# Patient Record
Sex: MALE | Race: WHITE | NOT HISPANIC OR LATINO
[De-identification: names, ages, dates, MRNs, and addresses within clinical notes are randomized per-mention and may not be internally consistent; named-entity substitution may affect disease eponyms.]

---

## 2015-11-04 RX ORDER — FUROSEMIDE 40 MG
1 TABLET ORAL
Qty: 0 | Refills: 0 | COMMUNITY
Start: 2015-11-04

## 2017-01-04 ENCOUNTER — APPOINTMENT (OUTPATIENT)
Dept: HEART AND VASCULAR | Facility: CLINIC | Age: 72
End: 2017-01-04

## 2017-01-04 ENCOUNTER — APPOINTMENT (OUTPATIENT)
Dept: NEPHROLOGY | Facility: CLINIC | Age: 72
End: 2017-01-04

## 2017-01-04 ENCOUNTER — RX RENEWAL (OUTPATIENT)
Age: 72
End: 2017-01-04

## 2017-01-04 VITALS
DIASTOLIC BLOOD PRESSURE: 86 MMHG | SYSTOLIC BLOOD PRESSURE: 176 MMHG | HEIGHT: 69 IN | BODY MASS INDEX: 30.81 KG/M2 | HEART RATE: 62 BPM | WEIGHT: 208 LBS

## 2017-01-04 VITALS — DIASTOLIC BLOOD PRESSURE: 83 MMHG | HEART RATE: 61 BPM | SYSTOLIC BLOOD PRESSURE: 157 MMHG

## 2017-01-04 VITALS — SYSTOLIC BLOOD PRESSURE: 150 MMHG | DIASTOLIC BLOOD PRESSURE: 78 MMHG

## 2017-01-08 LAB
BASOPHILS # BLD AUTO: 0.05 K/UL
BASOPHILS NFR BLD AUTO: 0.5 %
EOSINOPHIL # BLD AUTO: 0.26 K/UL
EOSINOPHIL NFR BLD AUTO: 2.7 %
HCT VFR BLD CALC: 43.1 %
HGB BLD-MCNC: 13.6 G/DL
IMM GRANULOCYTES NFR BLD AUTO: 0.5 %
LYMPHOCYTES # BLD AUTO: 1.2 K/UL
LYMPHOCYTES NFR BLD AUTO: 12.6 %
MAN DIFF?: NORMAL
MCHC RBC-ENTMCNC: 29.7 PG
MCHC RBC-ENTMCNC: 31.6 GM/DL
MCV RBC AUTO: 94.1 FL
MONOCYTES # BLD AUTO: 0.39 K/UL
MONOCYTES NFR BLD AUTO: 4.1 %
NEUTROPHILS # BLD AUTO: 7.56 K/UL
NEUTROPHILS NFR BLD AUTO: 79.6 %
PLATELET # BLD AUTO: 209 K/UL
RBC # BLD: 4.58 M/UL
RBC # FLD: 15.7 %
TACROLIMUS SERPL-MCNC: 6.7 NG/ML
WBC # FLD AUTO: 9.51 K/UL

## 2017-01-09 ENCOUNTER — RX RENEWAL (OUTPATIENT)
Age: 72
End: 2017-01-09

## 2017-01-16 ENCOUNTER — FORM ENCOUNTER (OUTPATIENT)
Age: 72
End: 2017-01-16

## 2017-01-17 ENCOUNTER — OUTPATIENT (OUTPATIENT)
Dept: OUTPATIENT SERVICES | Facility: HOSPITAL | Age: 72
LOS: 1 days | End: 2017-01-17
Payer: MEDICARE

## 2017-01-17 PROCEDURE — 75574 CT ANGIO HRT W/3D IMAGE: CPT | Mod: 26

## 2017-01-17 PROCEDURE — 75574 CT ANGIO HRT W/3D IMAGE: CPT

## 2017-01-23 ENCOUNTER — FORM ENCOUNTER (OUTPATIENT)
Age: 72
End: 2017-01-23

## 2017-01-23 VITALS
HEIGHT: 66 IN | DIASTOLIC BLOOD PRESSURE: 73 MMHG | OXYGEN SATURATION: 95 % | RESPIRATION RATE: 18 BRPM | SYSTOLIC BLOOD PRESSURE: 135 MMHG | TEMPERATURE: 98 F | HEART RATE: 56 BPM | WEIGHT: 203.05 LBS

## 2017-01-23 RX ORDER — CHLORHEXIDINE GLUCONATE 213 G/1000ML
1 SOLUTION TOPICAL ONCE
Qty: 0 | Refills: 0 | Status: DISCONTINUED | OUTPATIENT
Start: 2017-01-24 | End: 2017-01-24

## 2017-01-23 NOTE — H&P ADULT. - PROBLEM SELECTOR PLAN 2
baseline Cr ~1.4, BUN/Cr currently 31/1.66, will hydrate with NS@75cc/hr with frequent lung checks. Renal Dr. Jason consulted, agrees with hydration and recommends Mucomyst 1200mg PO x 1 dose

## 2017-01-23 NOTE — H&P ADULT. - ASSESSMENT
71 yr old M current ETOH abuser/former smoker, PMHx of HTN, hyperlipidemia, DM, renal transplant 2011, Afib, moderate AR, chronic diastolic CHF, with CCS Angina Class IV equivalent symptoms and abnormal CTA coronaries who presents to [facility] for recommended right and left heart catheterization with possible intervention.    Risks & benefits of procedure and alternative therapy have been explained to the patient including but not limited to: allergic reaction, bleeding w/possible need for blood transfusion, infection, renal and vascular compromise, limb damage, arrhythmia, stroke, vessel dissection/perforation, Myocardial infarction, emergent CABG. Informed consent obtained and in chart. 71 yr old M current ETOH abuser/former smoker, PMHx of HTN, hyperlipidemia, DM, renal transplant 2011, Afib, moderate AS, chronic diastolic CHF, with CCS Angina Class IV equivalent symptoms and abnormal CTA coronaries who presents to [facility] for recommended right and left heart catheterization with possible intervention.    Risks & benefits of procedure and alternative therapy have been explained to the patient including but not limited to: allergic reaction, bleeding w/possible need for blood transfusion, infection, renal and vascular compromise, limb damage, arrhythmia, stroke, vessel dissection/perforation, Myocardial infarction, emergent CABG. Informed consent obtained and in chart.

## 2017-01-23 NOTE — H&P ADULT. - PROBLEM SELECTOR PLAN 1
NPO, precath/consented, will load with ASA 325mg PO x 1 dose and Plavix 600mg PO x 1 dose. Will obtain Echo prior to cath as per Dr. Becker

## 2017-01-23 NOTE — H&P ADULT. - HISTORY OF PRESENT ILLNESS
**********Pt to bring in medications, please confirm    69yo man recent former smoker (quit 3 mos ago, previously 2 ppd x 50 years) PMH BPH, renal transplant 2011 @ [facility] (followed by Dr. Klein, no complications per pt), aortic stenosis, afib on pradaxa (last dose 1/22/2016 per pt), CHF w/ pEF 60%, HTN, HLD, IDDM2 who presented to Dr. Becker office with complaints of new onset dyspnea on exertion of <3 blocks, relieved with rest. Denies CP, palpitations, recent GIB, N/V, orthopnea, PND. Pt was subsequently sent for CTA 11/28/16 which revealed moderate aortic stenosis, aortic valve area 1.2 cm2, LV function is  mildly decreased, EF 50%, RV function is  mildly decreased, EF 43 %. **********Pt to bring in medications, please confirm    71yo man, reluctant historian, current ETOH use (3-4 shots of hard liquor/day), recent former smoker (quit 3 mos ago, previously 2 ppd x 50 years) PMH BPH, PVD, renal transplant 2011 @ [facility] (followed by Dr. Klein, no complications per pt), aortic stenosis, afib on pradaxa (last dose 1/22/2016 per pt), CHF w/ pEF 60%, HTN, HLD, IDDM2 who presented to Dr. Becker office with complaints of new onset dyspnea on exertion of <3 blocks, relieved with rest. Denies CP, palpitations, recent GIB, N/V, orthopnea, PND. Pt was subsequently sent for MRI 11/28/16 which revealed moderate aortic stenosis, aortic valve area 1.2 cm2, LV function is  mildly decreased, EF 50%, RV function is  mildly decreased, EF 43 %. CTA on 1/17/17 revealed calcium score is severely elevated at 3287, minimal stenosis in left main, severe stenosis in the proximal LCx, nonobstructive plaque in mid LCx and OM1 and minimal stenosis in OM 2, nonobstructive plaque in LAD, minimal stenosis in D1 and D2, mild stenosis in prox and mid RCA, nonobstructive plaque in distal RCA,  RPDA and RPL.     In light of pt's known risk factors as above, CCS class 3 anginal equivalent symptoms and recent abnormal CTA, pt now presents to [facility] for R and L heart catheterization with possible intervention to assess underlying CAD. **********Pt to bring in medications, please confirm    71 y/o man, reluctant historian, current ETOH use (3-4 shots of hard liquor/day), recent former smoker (quit 3 mos ago, previously 2 ppd x 50 years) PMH RBBB, BPH, PVD, renal transplant 2011 @ [facility] (followed by Dr. Klein, no complications per pt), aortic stenosis, afib on pradaxa (last dose 1/22/2016 per pt), CHF w/ pEF 60%, HTN, HLD, IDDM2 who presented to Dr. Becker office 1/04/17 with complaints of new onset non-exertional L sided chest "sensation" and dyspnea on exertion of < 3 blocks, relieved with rest. Denies palpitations, recent GIB, N/V, orthopnea, PND. Per MD note, EKG done that day revealed atrial fibrillation with RBBB. Pt was subsequently sent for CTA on 1/17/17 revealed calcium score is severely elevated at 3287, minimal stenosis in left main, severe stenosis in the proximal LCx, nonobstructive plaque in mid LCx and OM1 and minimal stenosis in OM 2, nonobstructive plaque in LAD, minimal stenosis in D1 and D2, mild stenosis in prox and mid RCA, nonobstructive plaque in distal RCA, RPDA and RPL. Previous MRI 11/28/16 which revealed moderate aortic stenosis, aortic valve area 1.2 cm2, LV function is  mildly decreased, EF 50%, RV function is  mildly decreased, EF 43 %.      In light of pt's known risk factors as above, CCS class 3 anginal equivalent symptoms and recent abnormal CTA, pt now presents to [facility] for R and L heart catheterization with possible intervention to assess underlying CAD. 71 yr old M reluctant historian, current ETOH use (3-4 shots of hard liquor/day), recent former smoker (quit 3 mos ago, previously 2 ppd x 50 years) PMH RBBB, BPH, PVD, renal transplant 2011 @ [facility] (followed by Dr. Klein, no complications per pt), aortic stenosis, afib on pradaxa (last dose 1/22/2016 per pt), CHF w/ pEF 60%, HTN, HLD, IDDM2 who presented to Dr. Becker office 1/04/17 with complaints of new onset non-exertional L sided chest "sensation" and dyspnea on exertion of < 3 blocks, relieved with rest. Denies palpitations, recent GIB, N/V, orthopnea, PND. Per MD note, EKG done that day revealed atrial fibrillation with RBBB. Pt was subsequently sent for CTA on 1/17/17 which revealed calcium score is severely elevated at 3287, minimal stenosis in left main, severe stenosis in the proximal LCx, nonobstructive plaque in mid LCx and OM1 and minimal stenosis in OM 2, nonobstructive plaque in LAD, minimal stenosis in D1 and D2, mild stenosis in prox and mid RCA, nonobstructive plaque in distal RCA, RPDA and RPL. Previous Cardiac MRI 11/28/16 revealed moderate aortic stenosis, aortic valve area 1.2 cm2, LV function is  mildly decreased, EF 50%, RV function is  mildly decreased, EF 43 %.      In light of pt's known risk factors as above, CCS class IV anginal equivalent symptoms and recent abnormal CTA, pt now presents to [facility] for R and L heart catheterization with possible intervention to assess underlying CAD. 71 yr old M reluctant historian, current ETOH use (3-4 shots of hard liquor/day), recent former smoker (quit 3 mos ago, previously 2 ppd x 50 years) [facility] HTN, hyperlipidemia, DM, BPH, PVD, CKD s/p renal transplant 2011 @ [facility] (followed by Dr. Jason, no complications per pt), aortic stenosis, afib on pradaxa (last dose 1/21/17 PM), CHF w/ pEF 60%,  who presented to Dr. Becker office 1/04/17 with complaints of new onset non-exertional L sided chest "sensation" and dyspnea on exertion of < 3 blocks, relieved with rest. Denies palpitations, recent GIB, N/V, orthopnea, PND. Per MD note, EKG done that day revealed atrial fibrillation with RBBB. Pt was subsequently sent for CTA on 1/17/17 which revealed calcium score is severely elevated at 3287, minimal stenosis in left main, severe stenosis in the proximal LCx, nonobstructive plaque in mid LCx and OM1 and minimal stenosis in OM 2, nonobstructive plaque in LAD, minimal stenosis in D1 and D2, mild stenosis in prox and mid RCA, nonobstructive plaque in distal RCA, RPDA and RPL. Previous Cardiac MRI 11/28/16 revealed moderate aortic stenosis, aortic valve area 1.2 cm2, LV function is  mildly decreased, EF 50%, RV function is  mildly decreased, EF 43 %.      In light of pt's known risk factors as above, CCS class IV anginal equivalent symptoms and recent abnormal CTA, pt now presents to [facility] for R and L heart catheterization with possible intervention to assess underlying CAD.

## 2017-01-24 ENCOUNTER — INPATIENT (INPATIENT)
Facility: HOSPITAL | Age: 72
LOS: 0 days | Discharge: HOME | DRG: 247 | End: 2017-01-25
Attending: INTERNAL MEDICINE | Admitting: INTERNAL MEDICINE
Payer: COMMERCIAL

## 2017-01-24 DIAGNOSIS — N18.9 CHRONIC KIDNEY DISEASE, UNSPECIFIED: ICD-10-CM

## 2017-01-24 DIAGNOSIS — I20.9 ANGINA PECTORIS, UNSPECIFIED: ICD-10-CM

## 2017-01-24 LAB
ALBUMIN SERPL ELPH-MCNC: 4.3 G/DL — SIGNIFICANT CHANGE UP (ref 3.4–5)
ALP SERPL-CCNC: 51 U/L — SIGNIFICANT CHANGE UP (ref 40–120)
ALT FLD-CCNC: 20 U/L — SIGNIFICANT CHANGE UP (ref 12–42)
ANION GAP SERPL CALC-SCNC: 8 MMOL/L — LOW (ref 9–16)
APTT BLD: 43 SEC — HIGH (ref 27.5–37.4)
AST SERPL-CCNC: 13 U/L — LOW (ref 15–37)
BASOPHILS NFR BLD AUTO: 0.7 % — SIGNIFICANT CHANGE UP (ref 0–2)
BILIRUB SERPL-MCNC: 0.9 MG/DL — SIGNIFICANT CHANGE UP (ref 0.2–1.2)
BUN SERPL-MCNC: 31 MG/DL — HIGH (ref 7–23)
CALCIUM SERPL-MCNC: 8.9 MG/DL — SIGNIFICANT CHANGE UP (ref 8.5–10.5)
CHLORIDE SERPL-SCNC: 101 MMOL/L — SIGNIFICANT CHANGE UP (ref 96–108)
CHOLEST SERPL-MCNC: 138 MG/DL — SIGNIFICANT CHANGE UP
CK MB CFR SERPL CALC: 1.3 NG/ML — SIGNIFICANT CHANGE UP (ref 0.5–3.6)
CO2 SERPL-SCNC: 25 MMOL/L — SIGNIFICANT CHANGE UP (ref 22–31)
CREAT SERPL-MCNC: 1.66 MG/DL — HIGH (ref 0.5–1.3)
CRP SERPL-MCNC: <0.29 MG/DL — SIGNIFICANT CHANGE UP
EOSINOPHIL NFR BLD AUTO: 4 % — SIGNIFICANT CHANGE UP (ref 0–6)
GLUCOSE SERPL-MCNC: 137 MG/DL — HIGH (ref 70–99)
HBA1C BLD-MCNC: 7.2 % — HIGH (ref 4.8–5.6)
HCT VFR BLD CALC: 42.1 % — SIGNIFICANT CHANGE UP (ref 39–50)
HDLC SERPL-MCNC: 44 MG/DL — SIGNIFICANT CHANGE UP
HGB BLD-MCNC: 13.9 G/DL — SIGNIFICANT CHANGE UP (ref 13–17)
INR BLD: 1.15 — SIGNIFICANT CHANGE UP (ref 0.88–1.16)
LIPID PNL WITH DIRECT LDL SERPL: 66 MG/DL — SIGNIFICANT CHANGE UP
LYMPHOCYTES # BLD AUTO: 20.4 % — SIGNIFICANT CHANGE UP (ref 13–44)
MCHC RBC-ENTMCNC: 30 PG — SIGNIFICANT CHANGE UP (ref 27–34)
MCHC RBC-ENTMCNC: 33 G/DL — SIGNIFICANT CHANGE UP (ref 32–36)
MCV RBC AUTO: 90.9 FL — SIGNIFICANT CHANGE UP (ref 80–100)
MONOCYTES NFR BLD AUTO: 9.3 % — SIGNIFICANT CHANGE UP (ref 2–14)
NEUTROPHILS NFR BLD AUTO: 65.6 % — SIGNIFICANT CHANGE UP (ref 43–77)
PLATELET # BLD AUTO: 177 K/UL — SIGNIFICANT CHANGE UP (ref 150–400)
POTASSIUM SERPL-MCNC: 4.9 MMOL/L — SIGNIFICANT CHANGE UP (ref 3.5–5.3)
POTASSIUM SERPL-SCNC: 4.9 MMOL/L — SIGNIFICANT CHANGE UP (ref 3.5–5.3)
PROT SERPL-MCNC: 8.1 G/DL — SIGNIFICANT CHANGE UP (ref 6.4–8.2)
PROTHROM AB SERPL-ACNC: 12.8 SEC — SIGNIFICANT CHANGE UP (ref 10–13.1)
RBC # BLD: 4.63 M/UL — SIGNIFICANT CHANGE UP (ref 4.2–5.8)
RBC # FLD: 15.2 % — SIGNIFICANT CHANGE UP (ref 10.3–16.9)
SODIUM SERPL-SCNC: 134 MMOL/L — LOW (ref 135–145)
TOTAL CHOLESTEROL/HDL RATIO MEASUREMENT: 3.1 RATIO — SIGNIFICANT CHANGE UP
TRIGL SERPL-MCNC: 139 MG/DL — SIGNIFICANT CHANGE UP
WBC # BLD: 6.7 K/UL — SIGNIFICANT CHANGE UP (ref 3.8–10.5)
WBC # FLD AUTO: 6.7 K/UL — SIGNIFICANT CHANGE UP (ref 3.8–10.5)

## 2017-01-24 PROCEDURE — 92928 PRQ TCAT PLMT NTRAC ST 1 LES: CPT | Mod: LC

## 2017-01-24 PROCEDURE — 99223 1ST HOSP IP/OBS HIGH 75: CPT

## 2017-01-24 PROCEDURE — 93571 IV DOP VEL&/PRESS C FLO 1ST: CPT | Mod: 26,LD

## 2017-01-24 PROCEDURE — 93010 ELECTROCARDIOGRAM REPORT: CPT

## 2017-01-24 PROCEDURE — 93572 IV DOP VEL&/PRESS C FLO EA: CPT | Mod: 26,LC

## 2017-01-24 PROCEDURE — 93456 R HRT CORONARY ARTERY ANGIO: CPT | Mod: 26,XU

## 2017-01-24 RX ORDER — POTASSIUM CHLORIDE 20 MEQ
20 PACKET (EA) ORAL ONCE
Qty: 0 | Refills: 0 | Status: COMPLETED | OUTPATIENT
Start: 2017-01-24 | End: 2017-01-24

## 2017-01-24 RX ORDER — SODIUM CHLORIDE 9 MG/ML
500 INJECTION INTRAMUSCULAR; INTRAVENOUS; SUBCUTANEOUS
Qty: 0 | Refills: 0 | Status: DISCONTINUED | OUTPATIENT
Start: 2017-01-24 | End: 2017-01-24

## 2017-01-24 RX ORDER — LINAGLIPTIN 5 MG/1
1 TABLET, FILM COATED ORAL
Qty: 0 | Refills: 0 | COMMUNITY

## 2017-01-24 RX ORDER — ACETYLCYSTEINE 200 MG/ML
1200 VIAL (ML) MISCELLANEOUS ONCE
Qty: 0 | Refills: 0 | Status: COMPLETED | OUTPATIENT
Start: 2017-01-24 | End: 2017-01-24

## 2017-01-24 RX ORDER — FAMOTIDINE 10 MG/ML
20 INJECTION INTRAVENOUS ONCE
Qty: 0 | Refills: 0 | Status: COMPLETED | OUTPATIENT
Start: 2017-01-24 | End: 2017-01-24

## 2017-01-24 RX ORDER — CLOPIDOGREL BISULFATE 75 MG/1
75 TABLET, FILM COATED ORAL DAILY
Qty: 0 | Refills: 0 | Status: DISCONTINUED | OUTPATIENT
Start: 2017-01-24 | End: 2017-01-25

## 2017-01-24 RX ORDER — TACROLIMUS 5 MG/1
1 CAPSULE ORAL DAILY
Qty: 0 | Refills: 0 | Status: DISCONTINUED | OUTPATIENT
Start: 2017-01-24 | End: 2017-01-25

## 2017-01-24 RX ORDER — FAMOTIDINE 10 MG/ML
1 INJECTION INTRAVENOUS
Qty: 0 | Refills: 0 | COMMUNITY

## 2017-01-24 RX ORDER — MYCOPHENOLIC ACID 180 MG/1
720 TABLET, DELAYED RELEASE ORAL
Qty: 0 | Refills: 0 | Status: DISCONTINUED | OUTPATIENT
Start: 2017-01-24 | End: 2017-01-25

## 2017-01-24 RX ORDER — RANITIDINE HYDROCHLORIDE 150 MG/1
1 TABLET, FILM COATED ORAL
Qty: 0 | Refills: 0 | COMMUNITY

## 2017-01-24 RX ORDER — CHOLECALCIFEROL (VITAMIN D3) 125 MCG
1 CAPSULE ORAL
Qty: 0 | Refills: 0 | COMMUNITY

## 2017-01-24 RX ORDER — ASPIRIN/CALCIUM CARB/MAGNESIUM 324 MG
81 TABLET ORAL DAILY
Qty: 0 | Refills: 0 | Status: DISCONTINUED | OUTPATIENT
Start: 2017-01-24 | End: 2017-01-25

## 2017-01-24 RX ORDER — TACROLIMUS 5 MG/1
1.5 CAPSULE ORAL
Qty: 0 | Refills: 0 | COMMUNITY

## 2017-01-24 RX ORDER — SODIUM CHLORIDE 9 MG/ML
500 INJECTION INTRAMUSCULAR; INTRAVENOUS; SUBCUTANEOUS
Qty: 0 | Refills: 0 | Status: DISCONTINUED | OUTPATIENT
Start: 2017-01-24 | End: 2017-01-25

## 2017-01-24 RX ORDER — METOPROLOL TARTRATE 50 MG
100 TABLET ORAL ONCE
Qty: 0 | Refills: 0 | Status: COMPLETED | OUTPATIENT
Start: 2017-01-24 | End: 2017-01-24

## 2017-01-24 RX ORDER — REPAGLINIDE 1 MG/1
1 TABLET ORAL
Qty: 0 | Refills: 0 | COMMUNITY

## 2017-01-24 RX ORDER — VALSARTAN 80 MG/1
320 TABLET ORAL DAILY
Qty: 0 | Refills: 0 | Status: DISCONTINUED | OUTPATIENT
Start: 2017-01-24 | End: 2017-01-25

## 2017-01-24 RX ORDER — POTASSIUM CHLORIDE 20 MEQ
1 PACKET (EA) ORAL
Qty: 0 | Refills: 0 | COMMUNITY

## 2017-01-24 RX ORDER — THIAMINE MONONITRATE (VIT B1) 100 MG
100 TABLET ORAL ONCE
Qty: 0 | Refills: 0 | Status: COMPLETED | OUTPATIENT
Start: 2017-01-24 | End: 2017-01-24

## 2017-01-24 RX ORDER — ATORVASTATIN CALCIUM 80 MG/1
10 TABLET, FILM COATED ORAL ONCE
Qty: 0 | Refills: 0 | Status: COMPLETED | OUTPATIENT
Start: 2017-01-24 | End: 2017-01-24

## 2017-01-24 RX ORDER — DABIGATRAN ETEXILATE MESYLATE 150 MG/1
150 CAPSULE ORAL
Qty: 0 | Refills: 0 | Status: DISCONTINUED | OUTPATIENT
Start: 2017-01-24 | End: 2017-01-25

## 2017-01-24 RX ORDER — CLOPIDOGREL BISULFATE 75 MG/1
600 TABLET, FILM COATED ORAL ONCE
Qty: 0 | Refills: 0 | Status: COMPLETED | OUTPATIENT
Start: 2017-01-24 | End: 2017-01-24

## 2017-01-24 RX ORDER — CLOPIDOGREL BISULFATE 75 MG/1
75 TABLET, FILM COATED ORAL ONCE
Qty: 0 | Refills: 0 | Status: DISCONTINUED | OUTPATIENT
Start: 2017-01-24 | End: 2017-01-24

## 2017-01-24 RX ORDER — MAGNESIUM OXIDE 400 MG ORAL TABLET 241.3 MG
1 TABLET ORAL
Qty: 0 | Refills: 0 | COMMUNITY

## 2017-01-24 RX ORDER — MAGNESIUM OXIDE 400 MG ORAL TABLET 241.3 MG
400 TABLET ORAL ONCE
Qty: 0 | Refills: 0 | Status: DISCONTINUED | OUTPATIENT
Start: 2017-01-24 | End: 2017-01-25

## 2017-01-24 RX ORDER — ASPIRIN/CALCIUM CARB/MAGNESIUM 324 MG
325 TABLET ORAL ONCE
Qty: 0 | Refills: 0 | Status: COMPLETED | OUTPATIENT
Start: 2017-01-24 | End: 2017-01-24

## 2017-01-24 RX ADMIN — CLOPIDOGREL BISULFATE 600 MILLIGRAM(S): 75 TABLET, FILM COATED ORAL at 08:08

## 2017-01-24 RX ADMIN — Medication 325 MILLIGRAM(S): at 08:08

## 2017-01-24 RX ADMIN — SODIUM CHLORIDE 75 MILLILITER(S): 9 INJECTION INTRAMUSCULAR; INTRAVENOUS; SUBCUTANEOUS at 08:09

## 2017-01-24 RX ADMIN — SODIUM CHLORIDE 75 MILLILITER(S): 9 INJECTION INTRAMUSCULAR; INTRAVENOUS; SUBCUTANEOUS at 18:59

## 2017-01-24 RX ADMIN — Medication 1200 MILLIGRAM(S): at 11:24

## 2017-01-24 NOTE — CONSULT NOTE ADULT - SUBJECTIVE AND OBJECTIVE BOX
HPI:    Per PA: 71 yr old M reluctant historian, current ETOH use (3-4 shots of hard liquor/day), recent former smoker (quit 3 mos ago, previously 2 ppd x 50 years) [facility] HTN, hyperlipidemia, DM, BPH, PVD, CKD s/p renal transplant 2011 @ [facility] (followed by Dr. Jason, no complications per pt), aortic stenosis, afib on pradaxa (last dose 1/21/17 PM), CHF w/ pEF 60%,  who presented to Dr. Becker office 1/04/17 with complaints of new onset non-exertional L sided chest "sensation" and dyspnea on exertion of < 3 blocks, relieved with rest. Denies palpitations, recent GIB, N/V, orthopnea, PND. Per MD note, EKG done that day revealed atrial fibrillation with RBBB. Pt was subsequently sent for CTA on 1/17/17 which revealed calcium score is severely elevated at 3287, minimal stenosis in left main, severe stenosis in the proximal LCx, nonobstructive plaque in mid LCx and OM1 and minimal stenosis in OM 2, nonobstructive plaque in LAD, minimal stenosis in D1 and D2, mild stenosis in prox and mid RCA, nonobstructive plaque in distal RCA, RPDA and RPL. Previous Cardiac MRI 11/28/16 revealed moderate aortic stenosis, aortic valve area 1.2 cm2, LV function is  mildly decreased, EF 50%, RV function is  mildly decreased, EF 43 %. In light of pt's known risk factors as above, CCS class IV anginal equivalent symptoms and recent abnormal CTA, pt now presents to [facility] for R and L heart catheterization with possible intervention to assess underlying CAD. (23 Jan 2017 12:46) Baseline CKD 1.4 - 1.6. Seen prior to cardiac cath, receiving IVF. No complaints.       PAST MEDICAL & SURGICAL HISTORY:  AV fistula: Left forearm  Essential hypertension: HTN (hypertension)  Hyperlipidemia: Hyperlipidemia  Complication of transplanted kidney: s/p right sided renal transplant  Atrial fibrillation: Atrial fibrillation  Type 2 diabetes mellitus: Diabetes  History of kidney transplant: right      MEDICATIONS:  sodium chloride 0.9%. 500milliLiter(s) IV Continuous <Continuous>  aspirin enteric coated 81milliGRAM(s) Oral daily  valsartan 320milliGRAM(s) Oral daily  diltiazem   CD 300milliGRAM(s) Oral Once  dabigatran 150milliGRAM(s) Oral two times a day  atorvastatin 10milliGRAM(s) Oral Once  metoprolol succinate ER 100milliGRAM(s) Oral Once  famotidine    Tablet 20milliGRAM(s) Oral Once  tacrolimus 1milliGRAM(s) Oral daily  mycophenolic Acid 720milliGRAM(s) Oral two times a day  thiamine 100milliGRAM(s) Oral Once  potassium chloride    Tablet ER 20milliEquivalent(s) Oral Once  magnesium oxide 400milliGRAM(s) Oral Once  clopidogrel Tablet 75milliGRAM(s) Oral daily      No pertinent family history in first degree relatives  Family history unknown      SOCIAL HISTORY:    REVIEW OF SYSTEMS:  Constitutional: No fevers.   Card: No chest pain.   Resp: No SOB.  GI: No nausea or vomiting. No abdominal pain.  : No dysuria. Neuro: No focal weakness or sensory loss.  Eyes: No visual symptoms. MS: No joint swelling.  Skin: No rashes. Psych: No psychosis.    PHYSICAL EXAM:    Constitutional: T(C): --  HR: --  BP: --  RR: --  SpO2: --  Wt(kg): --  Appearance: Alert, pleasant, INAD.  Eyes: Conjunctivae pink, moist. Pupils equal and reactive.  ENT: External ears/nose normal appearing. Lips normal, dentition good.  Lymphatic: No cervical lymphadenopathy. No supraclavicular lymphadenopathy.  Cardiac: No rubs. NO MURMURS. Extremities: TRACE BILATERAL ANKLE EDEMA.  Respiratory: Effort no accessory muscle use. Lungs: CLEAR TO AUSCULTATION.  Abdomen: Soft. No masses. Nontender. Liver: Not palpable. Spleen: Not palpable.  Musculoskeltal digits: No clubbing or cyanosis. Tone normal. Moves all four extremities.  Skin inspection: No rashes. Palpation: No abnormalities.  Neuro: Cranial nerves: no facial assymetry or deficits noted. Sensation: LE intact to light touch.  Psych: Oriented to person, place, situation. Mood/affect: Not depressed.     DATA:  134<L>  |  101    |  31<H>  ----------------------------<  137<H>  Ca:8.9   (24 Jan 2017 07:15)  4.9     |  25     |  1.66<H>      eGFR if Non : 41 <L>  eGFR if : 48 <L>    TPro  8.1 g/dL  /  Alb  4.3 g/dL  /  TBili  0.9 mg/dL  /  DBili  x      /  AST  13 U/L<L>  /  ALT  20 U/L  /  AlkPhos  51 U/L  24 Jan 2017 07:15                        13.9   6.7   )-----------( 177      ( 24 Jan 2017 07:15 )             42.1

## 2017-01-24 NOTE — CONSULT NOTE ADULT - ASSESSMENT
* CKD s/p renal transplant stable. * HTN controlled. * At risk for contrast nephropathy.    Suggest:    1.	Follow SCr postop.  2.	Continue valsartan, diltiazem, metoprolol.  3.	Continue IVF postop per protocol.  4.	Continue tacrolimus, mycophenolate.    Please call with any questions.    Reza Jason MD, FACP, FASN | kidney.[facility]  Nephrology, Hypertension, and Internal Medicine  Mobile: [phone] (Daytime Hours Only)  Office/Answering Service: [phone]  Asst. Prof. of Medicine, [facility] - Nephrology at [address]  [address], [address], [place], [place]

## 2017-01-25 ENCOUNTER — TRANSCRIPTION ENCOUNTER (OUTPATIENT)
Age: 72
End: 2017-01-25

## 2017-01-25 VITALS — WEIGHT: 203.93 LBS

## 2017-01-25 LAB
ANION GAP SERPL CALC-SCNC: 9 MMOL/L — SIGNIFICANT CHANGE UP (ref 9–16)
BUN SERPL-MCNC: 27 MG/DL — HIGH (ref 7–23)
CALCIUM SERPL-MCNC: 8.8 MG/DL — SIGNIFICANT CHANGE UP (ref 8.5–10.5)
CHLORIDE SERPL-SCNC: 103 MMOL/L — SIGNIFICANT CHANGE UP (ref 96–108)
CO2 SERPL-SCNC: 26 MMOL/L — SIGNIFICANT CHANGE UP (ref 22–31)
CREAT SERPL-MCNC: 1.22 MG/DL — SIGNIFICANT CHANGE UP (ref 0.5–1.3)
GLUCOSE SERPL-MCNC: 156 MG/DL — HIGH (ref 70–99)
HCT VFR BLD CALC: 37.5 % — LOW (ref 39–50)
HGB BLD-MCNC: 12.4 G/DL — LOW (ref 13–17)
MAGNESIUM SERPL-MCNC: 1.8 MG/DL — SIGNIFICANT CHANGE UP (ref 1.6–2.4)
MCHC RBC-ENTMCNC: 30.1 PG — SIGNIFICANT CHANGE UP (ref 27–34)
MCHC RBC-ENTMCNC: 33.1 G/DL — SIGNIFICANT CHANGE UP (ref 32–36)
MCV RBC AUTO: 91 FL — SIGNIFICANT CHANGE UP (ref 80–100)
PLATELET # BLD AUTO: 160 K/UL — SIGNIFICANT CHANGE UP (ref 150–400)
POTASSIUM SERPL-MCNC: 4.2 MMOL/L — SIGNIFICANT CHANGE UP (ref 3.5–5.3)
POTASSIUM SERPL-SCNC: 4.2 MMOL/L — SIGNIFICANT CHANGE UP (ref 3.5–5.3)
RBC # BLD: 4.12 M/UL — LOW (ref 4.2–5.8)
RBC # FLD: 15.1 % — SIGNIFICANT CHANGE UP (ref 10.3–16.9)
SODIUM SERPL-SCNC: 138 MMOL/L — SIGNIFICANT CHANGE UP (ref 135–145)
WBC # BLD: 6.5 K/UL — SIGNIFICANT CHANGE UP (ref 3.8–10.5)
WBC # FLD AUTO: 6.5 K/UL — SIGNIFICANT CHANGE UP (ref 3.8–10.5)

## 2017-01-25 PROCEDURE — C1769: CPT

## 2017-01-25 PROCEDURE — 83735 ASSAY OF MAGNESIUM: CPT

## 2017-01-25 PROCEDURE — 85610 PROTHROMBIN TIME: CPT

## 2017-01-25 PROCEDURE — 86140 C-REACTIVE PROTEIN: CPT

## 2017-01-25 PROCEDURE — 80061 LIPID PANEL: CPT

## 2017-01-25 PROCEDURE — 83036 HEMOGLOBIN GLYCOSYLATED A1C: CPT

## 2017-01-25 PROCEDURE — 80048 BASIC METABOLIC PNL TOTAL CA: CPT

## 2017-01-25 PROCEDURE — 93005 ELECTROCARDIOGRAM TRACING: CPT

## 2017-01-25 PROCEDURE — 82553 CREATINE MB FRACTION: CPT

## 2017-01-25 PROCEDURE — 93010 ELECTROCARDIOGRAM REPORT: CPT

## 2017-01-25 PROCEDURE — C1889: CPT

## 2017-01-25 PROCEDURE — C1874: CPT

## 2017-01-25 PROCEDURE — C1887: CPT

## 2017-01-25 PROCEDURE — 93306 TTE W/DOPPLER COMPLETE: CPT

## 2017-01-25 PROCEDURE — 80053 COMPREHEN METABOLIC PANEL: CPT

## 2017-01-25 PROCEDURE — 85027 COMPLETE CBC AUTOMATED: CPT

## 2017-01-25 PROCEDURE — C1725: CPT

## 2017-01-25 PROCEDURE — 82550 ASSAY OF CK (CPK): CPT

## 2017-01-25 PROCEDURE — 85025 COMPLETE CBC W/AUTO DIFF WBC: CPT

## 2017-01-25 PROCEDURE — 85730 THROMBOPLASTIN TIME PARTIAL: CPT

## 2017-01-25 PROCEDURE — 36415 COLL VENOUS BLD VENIPUNCTURE: CPT

## 2017-01-25 PROCEDURE — C1894: CPT

## 2017-01-25 RX ORDER — CLOPIDOGREL BISULFATE 75 MG/1
1 TABLET, FILM COATED ORAL
Qty: 30 | Refills: 0 | OUTPATIENT
Start: 2017-01-25 | End: 2017-02-24

## 2017-01-25 RX ORDER — CLOPIDOGREL BISULFATE 75 MG/1
1 TABLET, FILM COATED ORAL
Qty: 30 | Refills: 2 | OUTPATIENT
Start: 2017-01-25 | End: 2017-04-24

## 2017-01-25 RX ORDER — METFORMIN HYDROCHLORIDE 850 MG/1
1 TABLET ORAL
Qty: 0 | Refills: 0 | COMMUNITY

## 2017-01-25 RX ORDER — ASPIRIN/CALCIUM CARB/MAGNESIUM 324 MG
1 TABLET ORAL
Qty: 30 | Refills: 0 | OUTPATIENT
Start: 2017-01-25 | End: 2017-02-24

## 2017-01-25 RX ORDER — DABIGATRAN ETEXILATE MESYLATE 150 MG/1
1 CAPSULE ORAL
Qty: 60 | Refills: 2 | OUTPATIENT
Start: 2017-01-25 | End: 2017-04-24

## 2017-01-25 RX ORDER — METOPROLOL TARTRATE 50 MG
1 TABLET ORAL
Qty: 30 | Refills: 2 | OUTPATIENT
Start: 2017-01-25 | End: 2017-04-24

## 2017-01-25 RX ORDER — METOPROLOL TARTRATE 50 MG
1 TABLET ORAL
Qty: 0 | Refills: 0 | COMMUNITY

## 2017-01-25 RX ORDER — MAGNESIUM OXIDE 400 MG ORAL TABLET 241.3 MG
400 TABLET ORAL ONCE
Qty: 0 | Refills: 0 | Status: COMPLETED | OUTPATIENT
Start: 2017-01-25 | End: 2017-01-25

## 2017-01-25 RX ADMIN — VALSARTAN 320 MILLIGRAM(S): 80 TABLET ORAL at 05:28

## 2017-01-25 RX ADMIN — TACROLIMUS 1 MILLIGRAM(S): 5 CAPSULE ORAL at 13:58

## 2017-01-25 RX ADMIN — Medication 81 MILLIGRAM(S): at 13:01

## 2017-01-25 RX ADMIN — DABIGATRAN ETEXILATE MESYLATE 150 MILLIGRAM(S): 150 CAPSULE ORAL at 05:26

## 2017-01-25 RX ADMIN — CLOPIDOGREL BISULFATE 75 MILLIGRAM(S): 75 TABLET, FILM COATED ORAL at 13:01

## 2017-01-25 RX ADMIN — MAGNESIUM OXIDE 400 MG ORAL TABLET 400 MILLIGRAM(S): 241.3 TABLET ORAL at 09:51

## 2017-01-25 RX ADMIN — MYCOPHENOLIC ACID 720 MILLIGRAM(S): 180 TABLET, DELAYED RELEASE ORAL at 05:27

## 2017-01-25 NOTE — DISCHARGE NOTE ADULT - PATIENT PORTAL LINK FT
“You can access the FollowHealth Patient Portal, offered by [facility], by registering with the following website: [email or web address]”

## 2017-01-25 NOTE — DISCHARGE NOTE ADULT - MEDICATION SUMMARY - MEDICATIONS TO TAKE
I will START or STAY ON the medications listed below when I get home from the hospital:    aspirin 81 mg oral tablet  -- 1 tab(s) by mouth once a day    STOP TAKING ASPIRIN AFTER ONE MONTH.  -- Indication: For Stent    valsartan 320 mg oral tablet  -- 1 tab(s) by mouth once a day  -- Indication: For Hypertension    Rapaflo 8 mg oral capsule  -- 1 cap(s) by mouth once a day  -- Indication: For BPH    diltiazem 300 mg/24 hours oral tablet, extended release  -- 1 tab(s) by mouth once a day  -- Indication: For Atrial Fibrillation    Pradaxa 150 mg oral capsule  -- 1 cap(s) by mouth 2 times a day  -- Indication: For Atrial Fibrillation    Tradjenta 5 mg oral tablet  -- 1 tab(s) by mouth once a day  -- Indication: For Diabetes    metFORMIN 1000 mg oral tablet  -- 1 tab(s) by mouth 2 times a day    RESTART ON SATURDAY JAN. 28, 2017.  -- Indication: For Diabetes    Prandin 2 mg oral tablet  -- 1 tab(s) by mouth once a day (at bedtime)  -- Indication: For Diabetes    Lipitor 10 mg oral tablet  -- 1 tab(s) by mouth once a day (at bedtime)  -- Indication: For Cholesterol    clopidogrel 75 mg oral tablet  -- 1 tab(s) by mouth once a day.  -- Indication: For Stent/CAD    Toprol-XL 50 mg oral tablet, extended release  -- 1 tab(s) by mouth once a day  -- Indication: For Atrial Fibrillation    bimatoprost topical ophthalmic  -- Apply on skin to affected area once a day (at bedtime)  -- Indication: For Skin    furosemide 40 mg oral tablet  -- 1 tab(s) by mouth every 12 hours  -- Indication: For diastolic CHF    famotidine 20 mg oral tablet  -- 1 tab(s) by mouth once a day  -- Indication: For GERD    Zantac 150 oral tablet  -- 1 tab(s) by mouth once a day  -- Indication: For GERD    raNITIdine 150 mg oral capsule  -- 1 cap(s) by mouth once a day  -- Indication: For GERD    tacrolimus 1 mg oral tablet, extended release  -- 1.5 tab(s) by mouth once a day  -- Indication: For Kidney Transplant    mycophenolic acid 360 mg oral delayed release tablet  -- 2 tab(s) by mouth every 12 hours  -- Indication: For Kidney Transplant    tacrolimus 1 mg oral capsule  -- 1 cap(s) by mouth once a day (at bedtime)  -- Indication: For Kidney Transplant    docusate sodium 100 mg oral capsule  -- 1 cap(s) by mouth 2 times a day  -- Indication: For Constipation    magnesium oxide 400 mg (241.3 mg elemental magnesium) oral tablet  -- 1 tab(s) by mouth once a day  -- Indication: For Supplement    potassium chloride 20 mEq oral tablet, extended release  -- 1 tab(s) by mouth once a day  -- Indication: For Supplement    brimonidine-timolol 0.2%-0.5% ophthalmic solution  -- 1 drop(s) to each affected eye every 12 hours  -- Indication: For Glaucoma    Vitamin D3 1000 intl units oral capsule  -- 1 cap(s) by mouth once a day  -- Indication: For Supplement    thiamine 100 mg oral tablet  -- 1 tab(s) by mouth once a day  -- Indication: For Supplement

## 2017-01-25 NOTE — PROVIDER CONTACT NOTE (OTHER) - ACTION/TREATMENT ORDERED:
upon EKG , patient noted to have a  junctional rhythm.  pacing pad placed on the patient and life pack by the bedside.

## 2017-01-25 NOTE — DISCHARGE NOTE ADULT - CARE PROVIDERS DIRECT ADDRESSES
[email or web address]

## 2017-01-25 NOTE — PROVIDER CONTACT NOTE (OTHER) - SITUATION
Patient HR drop to 33B/min  patient asymptomatic. No complaint of chest pain or discomfort.  /52. A&ox3

## 2017-01-25 NOTE — DISCHARGE NOTE ADULT - HOSPITAL COURSE
70 y/o male presented with chest pain and had abnormal CTA cors.   Pt now s/p WALLY distal LCx, WALLY prox LCx.   Pt had slow afib overnight, BB dose was decreased.  This morning pt had no complaints.  Labs acceptable, VSS.  Physical exam unremarkable, right wrist and brachial access site stable.  Pt stable for discharge today.

## 2017-01-25 NOTE — PROVIDER CONTACT NOTE (MEDICATION) - BACKGROUND
Pt admitted through cath lab and as such, has meds ordered as one time meds rather than daily meds as indicated on d/c med list. RN noted same and discussed with FLOR Lu.

## 2017-01-25 NOTE — DISCHARGE NOTE ADULT - SECONDARY DIAGNOSIS.
Atrial fibrillation, unspecified type Type 2 diabetes mellitus without complication, unspecified long term insulin use status

## 2017-01-25 NOTE — PROVIDER CONTACT NOTE (OTHER) - ASSESSMENT
Patient HR drop to 33B/min  patient asymptomatic. No complaint of chest pain or discomfort.  /52. A&ox3. patient denies chest pain or SOB.

## 2017-01-25 NOTE — DISCHARGE NOTE ADULT - CARE PROVIDER_API CALL
Colton Becker), Cardiovascular Disease; Internal Medicine  [address] [place] [address]  Phone: [phone]  Fax: [phone]

## 2017-01-25 NOTE — DISCHARGE NOTE ADULT - PLAN OF CARE
Take Aspirin, Plavix and Pradaxa (Triple Therapy) for one month, then STOP Aspirin, but continue Plavix and Pradaxa for an additional two months. Follow up with Dr. Becker in 1-2 weeks.    Monitor your right wrist for signs of infection including redness, warmth, or discharge. Also monitor for bleeding, swelling, or severe pain. Do not lift anything heavier than 5 pounds for at least 3 days. Do not soak in a tub or go swimming for at least 3 days. Avoid strenuous activity for 3-5 days, including driving. Follow up with Dr. Becker in 1-2 weeks. DO NOT TAKE METFORMIN FOR TWO DAYS. Restart Metformin on Saturday January 28, 2017. Follow up with your PMD in 1-2 weeks.

## 2017-01-25 NOTE — DISCHARGE NOTE ADULT - CARE PLAN
Principal Discharge DX:	Coronary artery disease involving native coronary artery of native heart, angina presence unspecified  Goal:	Take Aspirin, Plavix and Pradaxa (Triple Therapy) for one month, then STOP Aspirin, but continue Plavix and Pradaxa for an additional two months.  Instructions for follow-up, activity and diet:	Follow up with Dr. Becker in 1-2 weeks.    Monitor your right wrist for signs of infection including redness, warmth, or discharge. Also monitor for bleeding, swelling, or severe pain. Do not lift anything heavier than 5 pounds for at least 3 days. Do not soak in a tub or go swimming for at least 3 days. Avoid strenuous activity for 3-5 days, including driving.  Secondary Diagnosis:	Atrial fibrillation, unspecified type  Goal:	Take Aspirin, Plavix and Pradaxa (Triple Therapy) for one month, then STOP Aspirin, but continue Plavix and Pradaxa for an additional two months.  Instructions for follow-up, activity and diet:	Follow up with Dr. Becker in 1-2 weeks.  Secondary Diagnosis:	Type 2 diabetes mellitus without complication, unspecified long term insulin use status  Goal:	DO NOT TAKE METFORMIN FOR TWO DAYS. Restart Metformin on Saturday January 28, 2017.  Instructions for follow-up, activity and diet:	Follow up with your PMD in 1-2 weeks.

## 2017-01-27 DIAGNOSIS — I73.9 PERIPHERAL VASCULAR DISEASE, UNSPECIFIED: ICD-10-CM

## 2017-01-27 DIAGNOSIS — Z79.84 LONG TERM (CURRENT) USE OF ORAL HYPOGLYCEMIC DRUGS: ICD-10-CM

## 2017-01-27 DIAGNOSIS — I25.110 ATHEROSCLEROTIC HEART DISEASE OF NATIVE CORONARY ARTERY WITH UNSTABLE ANGINA PECTORIS: ICD-10-CM

## 2017-01-27 DIAGNOSIS — I25.10 ATHEROSCLEROTIC HEART DISEASE OF NATIVE CORONARY ARTERY WITHOUT ANGINA PECTORIS: ICD-10-CM

## 2017-01-27 DIAGNOSIS — N40.0 BENIGN PROSTATIC HYPERPLASIA WITHOUT LOWER URINARY TRACT SYMPTOMS: ICD-10-CM

## 2017-01-27 DIAGNOSIS — Z94.0 KIDNEY TRANSPLANT STATUS: ICD-10-CM

## 2017-01-27 DIAGNOSIS — Z87.891 PERSONAL HISTORY OF NICOTINE DEPENDENCE: ICD-10-CM

## 2017-01-27 DIAGNOSIS — N18.9 CHRONIC KIDNEY DISEASE, UNSPECIFIED: ICD-10-CM

## 2017-01-27 DIAGNOSIS — I35.0 NONRHEUMATIC AORTIC (VALVE) STENOSIS: ICD-10-CM

## 2017-01-27 DIAGNOSIS — E11.22 TYPE 2 DIABETES MELLITUS WITH DIABETIC CHRONIC KIDNEY DISEASE: ICD-10-CM

## 2017-01-27 DIAGNOSIS — Z79.01 LONG TERM (CURRENT) USE OF ANTICOAGULANTS: ICD-10-CM

## 2017-01-27 DIAGNOSIS — F10.10 ALCOHOL ABUSE, UNCOMPLICATED: ICD-10-CM

## 2017-01-27 DIAGNOSIS — I48.91 UNSPECIFIED ATRIAL FIBRILLATION: ICD-10-CM

## 2017-01-27 DIAGNOSIS — I12.9 HYPERTENSIVE CHRONIC KIDNEY DISEASE WITH STAGE 1 THROUGH STAGE 4 CHRONIC KIDNEY DISEASE, OR UNSPECIFIED CHRONIC KIDNEY DISEASE: ICD-10-CM

## 2017-01-27 DIAGNOSIS — E78.5 HYPERLIPIDEMIA, UNSPECIFIED: ICD-10-CM

## 2017-01-30 ENCOUNTER — APPOINTMENT (OUTPATIENT)
Dept: NEPHROLOGY | Facility: CLINIC | Age: 72
End: 2017-01-30

## 2017-01-30 VITALS — SYSTOLIC BLOOD PRESSURE: 104 MMHG | DIASTOLIC BLOOD PRESSURE: 52 MMHG | HEART RATE: 72 BPM

## 2017-01-30 VITALS — WEIGHT: 199 LBS | BODY MASS INDEX: 29.39 KG/M2

## 2017-01-30 VITALS — DIASTOLIC BLOOD PRESSURE: 62 MMHG | SYSTOLIC BLOOD PRESSURE: 113 MMHG

## 2017-02-04 LAB
ALBUMIN SERPL ELPH-MCNC: 4.9 G/DL
ALP BLD-CCNC: 47 U/L
ALT SERPL-CCNC: 21 U/L
ANION GAP SERPL CALC-SCNC: 19 MMOL/L
AST SERPL-CCNC: 19 U/L
BASOPHILS # BLD AUTO: 0.08 K/UL
BASOPHILS NFR BLD AUTO: 1.2 %
BILIRUB SERPL-MCNC: 0.7 MG/DL
BUN SERPL-MCNC: 30 MG/DL
CALCIUM SERPL-MCNC: 10.3 MG/DL
CHLORIDE SERPL-SCNC: 99 MMOL/L
CO2 SERPL-SCNC: 23 MMOL/L
CREAT SERPL-MCNC: 1.33 MG/DL
EOSINOPHIL # BLD AUTO: 0.17 K/UL
EOSINOPHIL NFR BLD AUTO: 2.5 %
GLUCOSE SERPL-MCNC: 142 MG/DL
HCT VFR BLD CALC: 43.1 %
HGB BLD-MCNC: 13.4 G/DL
IMM GRANULOCYTES NFR BLD AUTO: 0.6 %
LYMPHOCYTES # BLD AUTO: 1.27 K/UL
LYMPHOCYTES NFR BLD AUTO: 18.8 %
MAGNESIUM SERPL-MCNC: 2.1 MG/DL
MAN DIFF?: NORMAL
MCHC RBC-ENTMCNC: 29.8 PG
MCHC RBC-ENTMCNC: 31.1 GM/DL
MCV RBC AUTO: 95.8 FL
MONOCYTES # BLD AUTO: 0.49 K/UL
MONOCYTES NFR BLD AUTO: 7.3 %
NEUTROPHILS # BLD AUTO: 4.7 K/UL
NEUTROPHILS NFR BLD AUTO: 69.6 %
PLATELET # BLD AUTO: 210 K/UL
POTASSIUM SERPL-SCNC: 5.1 MMOL/L
PROT SERPL-MCNC: 7.7 G/DL
RBC # BLD: 4.5 M/UL
RBC # FLD: 15.5 %
SODIUM SERPL-SCNC: 141 MMOL/L
TACROLIMUS SERPL-MCNC: 4.2 NG/ML
WBC # FLD AUTO: 6.75 K/UL

## 2017-02-13 ENCOUNTER — APPOINTMENT (OUTPATIENT)
Dept: HEART AND VASCULAR | Facility: CLINIC | Age: 72
End: 2017-02-13

## 2017-02-13 VITALS
BODY MASS INDEX: 29.92 KG/M2 | HEIGHT: 69 IN | SYSTOLIC BLOOD PRESSURE: 148 MMHG | WEIGHT: 202 LBS | HEART RATE: 50 BPM | DIASTOLIC BLOOD PRESSURE: 62 MMHG

## 2017-02-13 VITALS — DIASTOLIC BLOOD PRESSURE: 68 MMHG | SYSTOLIC BLOOD PRESSURE: 134 MMHG

## 2017-02-13 DIAGNOSIS — Z87.891 PERSONAL HISTORY OF NICOTINE DEPENDENCE: ICD-10-CM

## 2017-03-19 ENCOUNTER — RX RENEWAL (OUTPATIENT)
Age: 72
End: 2017-03-19

## 2017-03-19 RX ORDER — DOCUSATE SODIUM 100 MG/1
100 CAPSULE, LIQUID FILLED ORAL
Qty: 100 | Refills: 0 | Status: ACTIVE | COMMUNITY
Start: 2017-03-19 | End: 1900-01-01

## 2017-03-30 ENCOUNTER — RX RENEWAL (OUTPATIENT)
Age: 72
End: 2017-03-30

## 2017-03-31 ENCOUNTER — RX RENEWAL (OUTPATIENT)
Age: 72
End: 2017-03-31

## 2017-04-03 ENCOUNTER — RX RENEWAL (OUTPATIENT)
Age: 72
End: 2017-04-03

## 2017-04-10 ENCOUNTER — RX RENEWAL (OUTPATIENT)
Age: 72
End: 2017-04-10

## 2017-04-14 ENCOUNTER — RX RENEWAL (OUTPATIENT)
Age: 72
End: 2017-04-14

## 2017-04-26 ENCOUNTER — APPOINTMENT (OUTPATIENT)
Dept: NEPHROLOGY | Facility: CLINIC | Age: 72
End: 2017-04-26

## 2017-04-26 VITALS — WEIGHT: 200 LBS | BODY MASS INDEX: 29.54 KG/M2

## 2017-04-26 VITALS — SYSTOLIC BLOOD PRESSURE: 143 MMHG | DIASTOLIC BLOOD PRESSURE: 71 MMHG | HEART RATE: 60 BPM

## 2017-04-26 VITALS — SYSTOLIC BLOOD PRESSURE: 144 MMHG | DIASTOLIC BLOOD PRESSURE: 83 MMHG

## 2017-04-27 ENCOUNTER — MEDICATION RENEWAL (OUTPATIENT)
Age: 72
End: 2017-04-27

## 2017-05-01 ENCOUNTER — RX RENEWAL (OUTPATIENT)
Age: 72
End: 2017-05-01

## 2017-05-02 LAB
ALBUMIN SERPL ELPH-MCNC: 4.9 G/DL
ALP BLD-CCNC: 53 U/L
ALT SERPL-CCNC: 23 U/L
ANION GAP SERPL CALC-SCNC: 20 MMOL/L
APPEARANCE: CLEAR
AST SERPL-CCNC: 20 U/L
BASOPHILS # BLD AUTO: 0.06 K/UL
BASOPHILS NFR BLD AUTO: 0.8 %
BILIRUB SERPL-MCNC: 0.4 MG/DL
BILIRUBIN URINE: NEGATIVE
BLOOD URINE: NEGATIVE
BUN SERPL-MCNC: 25 MG/DL
CALCIUM SERPL-MCNC: 10.3 MG/DL
CALCIUM SERPL-MCNC: 10.3 MG/DL
CHLORIDE SERPL-SCNC: 100 MMOL/L
CHOLEST SERPL-MCNC: 151 MG/DL
CHOLEST/HDLC SERPL: 3.7 RATIO
CO2 SERPL-SCNC: 19 MMOL/L
COLOR: YELLOW
CREAT SERPL-MCNC: 1.33 MG/DL
CREAT SPEC-SCNC: 16 MG/DL
EOSINOPHIL # BLD AUTO: 0.18 K/UL
EOSINOPHIL NFR BLD AUTO: 2.4 %
GLUCOSE QUALITATIVE U: NORMAL MG/DL
GLUCOSE SERPL-MCNC: 140 MG/DL
HBA1C MFR BLD HPLC: 6.1 %
HCT VFR BLD CALC: 43.6 %
HDLC SERPL-MCNC: 41 MG/DL
HGB BLD-MCNC: 13.4 G/DL
IMM GRANULOCYTES NFR BLD AUTO: 0.5 %
KETONES URINE: NEGATIVE
LDLC SERPL CALC-MCNC: 84 MG/DL
LEUKOCYTE ESTERASE URINE: NEGATIVE
LYMPHOCYTES # BLD AUTO: 0.7 K/UL
LYMPHOCYTES NFR BLD AUTO: 9.4 %
MAGNESIUM SERPL-MCNC: 1.7 MG/DL
MAN DIFF?: NORMAL
MCHC RBC-ENTMCNC: 30.4 PG
MCHC RBC-ENTMCNC: 30.7 GM/DL
MCV RBC AUTO: 98.9 FL
MICROALBUMIN 24H UR DL<=1MG/L-MCNC: 1 MG/DL
MICROALBUMIN/CREAT 24H UR-RTO: 62 UG/MG
MONOCYTES # BLD AUTO: 0.69 K/UL
MONOCYTES NFR BLD AUTO: 9.3 %
NEUTROPHILS # BLD AUTO: 5.76 K/UL
NEUTROPHILS NFR BLD AUTO: 77.6 %
NITRITE URINE: NEGATIVE
PARATHYROID HORMONE INTACT: 47 PG/ML
PH URINE: 5
PHOSPHATE SERPL-MCNC: 5.1 MG/DL
PLATELET # BLD AUTO: 257 K/UL
POTASSIUM SERPL-SCNC: 5.4 MMOL/L
PROT SERPL-MCNC: 7.5 G/DL
PROTEIN URINE: NEGATIVE MG/DL
RBC # BLD: 4.41 M/UL
RBC # FLD: 14.9 %
SODIUM SERPL-SCNC: 139 MMOL/L
SPECIFIC GRAVITY URINE: 1.01
TACROLIMUS SERPL-MCNC: 7 NG/ML
TRIGL SERPL-MCNC: 128 MG/DL
TSH SERPL-ACNC: 1.43 UIU/ML
URATE SERPL-MCNC: 10.4 MG/DL
UROBILINOGEN URINE: NORMAL MG/DL
WBC # FLD AUTO: 7.43 K/UL

## 2017-05-08 ENCOUNTER — RX RENEWAL (OUTPATIENT)
Age: 72
End: 2017-05-08

## 2017-07-04 ENCOUNTER — RX RENEWAL (OUTPATIENT)
Age: 72
End: 2017-07-04

## 2017-07-20 ENCOUNTER — RX RENEWAL (OUTPATIENT)
Age: 72
End: 2017-07-20

## 2017-07-24 ENCOUNTER — APPOINTMENT (OUTPATIENT)
Dept: HEART AND VASCULAR | Facility: CLINIC | Age: 72
End: 2017-07-24

## 2017-07-24 VITALS
BODY MASS INDEX: 29.33 KG/M2 | WEIGHT: 198 LBS | HEART RATE: 64 BPM | HEIGHT: 69 IN | DIASTOLIC BLOOD PRESSURE: 90 MMHG | SYSTOLIC BLOOD PRESSURE: 140 MMHG

## 2017-07-24 VITALS — DIASTOLIC BLOOD PRESSURE: 76 MMHG | SYSTOLIC BLOOD PRESSURE: 124 MMHG

## 2017-07-24 RX ORDER — METOPROLOL SUCCINATE 100 MG/1
100 TABLET, EXTENDED RELEASE ORAL
Qty: 60 | Refills: 0 | Status: DISCONTINUED | COMMUNITY
Start: 2017-01-15

## 2017-07-24 RX ORDER — BIMATOPROST 0.1 MG/ML
0.01 SOLUTION/ DROPS OPHTHALMIC
Qty: 2 | Refills: 0 | Status: ACTIVE | COMMUNITY
Start: 2017-01-05

## 2017-07-24 RX ORDER — ATORVASTATIN CALCIUM 10 MG/1
10 TABLET, FILM COATED ORAL
Qty: 30 | Refills: 0 | Status: DISCONTINUED | COMMUNITY
Start: 2017-01-11

## 2017-07-24 RX ORDER — BRIMONIDINE TARTRATE, TIMOLOL MALEATE 2; 5 MG/ML; MG/ML
0.2-0.5 SOLUTION/ DROPS OPHTHALMIC
Qty: 10 | Refills: 0 | Status: ACTIVE | COMMUNITY
Start: 2017-01-05

## 2017-07-31 ENCOUNTER — RX RENEWAL (OUTPATIENT)
Age: 72
End: 2017-07-31

## 2017-08-15 RX ORDER — LINAGLIPTIN 5 MG/1
5 TABLET, FILM COATED ORAL
Qty: 30 | Refills: 0 | Status: ACTIVE | COMMUNITY
Start: 2017-08-15 | End: 1900-01-01

## 2017-08-27 ENCOUNTER — RX RENEWAL (OUTPATIENT)
Age: 72
End: 2017-08-27

## 2017-08-28 ENCOUNTER — APPOINTMENT (OUTPATIENT)
Dept: NEPHROLOGY | Facility: CLINIC | Age: 72
End: 2017-08-28
Payer: MEDICARE

## 2017-08-28 VITALS — DIASTOLIC BLOOD PRESSURE: 62 MMHG | HEART RATE: 50 BPM | SYSTOLIC BLOOD PRESSURE: 123 MMHG

## 2017-08-28 VITALS — SYSTOLIC BLOOD PRESSURE: 121 MMHG | DIASTOLIC BLOOD PRESSURE: 73 MMHG

## 2017-08-28 VITALS — WEIGHT: 200 LBS | BODY MASS INDEX: 29.54 KG/M2

## 2017-08-28 PROCEDURE — 36415 COLL VENOUS BLD VENIPUNCTURE: CPT

## 2017-08-28 PROCEDURE — 99215 OFFICE O/P EST HI 40 MIN: CPT | Mod: 25

## 2017-09-02 LAB
25(OH)D3 SERPL-MCNC: 37.6 NG/ML
ALBUMIN SERPL ELPH-MCNC: 5.2 G/DL
ALP BLD-CCNC: 50 U/L
ALT SERPL-CCNC: 22 U/L
ANION GAP SERPL CALC-SCNC: 18 MMOL/L
APPEARANCE: CLEAR
AST SERPL-CCNC: 25 U/L
BASOPHILS # BLD AUTO: 0.06 K/UL
BASOPHILS NFR BLD AUTO: 0.8 %
BILIRUB SERPL-MCNC: 0.8 MG/DL
BILIRUBIN URINE: NEGATIVE
BLOOD URINE: NEGATIVE
BUN SERPL-MCNC: 20 MG/DL
CALCIUM SERPL-MCNC: 10.2 MG/DL
CALCIUM SERPL-MCNC: 10.2 MG/DL
CHLORIDE SERPL-SCNC: 97 MMOL/L
CHOLEST SERPL-MCNC: 141 MG/DL
CHOLEST/HDLC SERPL: 3.2 RATIO
CO2 SERPL-SCNC: 24 MMOL/L
COLOR: YELLOW
CREAT SERPL-MCNC: 1.26 MG/DL
CREAT SPEC-SCNC: 21 MG/DL
EOSINOPHIL # BLD AUTO: 0.16 K/UL
EOSINOPHIL NFR BLD AUTO: 2.1 %
GLUCOSE QUALITATIVE U: NORMAL MG/DL
GLUCOSE SERPL-MCNC: 150 MG/DL
HBA1C MFR BLD HPLC: 6.6 %
HCT VFR BLD CALC: 45.6 %
HDLC SERPL-MCNC: 44 MG/DL
HGB BLD-MCNC: 14.4 G/DL
IMM GRANULOCYTES NFR BLD AUTO: 0.5 %
KETONES URINE: NEGATIVE
LDLC SERPL CALC-MCNC: 72 MG/DL
LEUKOCYTE ESTERASE URINE: NEGATIVE
LYMPHOCYTES # BLD AUTO: 0.73 K/UL
LYMPHOCYTES NFR BLD AUTO: 9.7 %
MAGNESIUM SERPL-MCNC: 1.8 MG/DL
MAN DIFF?: NORMAL
MCHC RBC-ENTMCNC: 30.6 PG
MCHC RBC-ENTMCNC: 31.6 GM/DL
MCV RBC AUTO: 97 FL
MICROALBUMIN 24H UR DL<=1MG/L-MCNC: 0.8 MG/DL
MICROALBUMIN/CREAT 24H UR-RTO: 38 MG/G
MONOCYTES # BLD AUTO: 0.99 K/UL
MONOCYTES NFR BLD AUTO: 13.2 %
NEUTROPHILS # BLD AUTO: 5.54 K/UL
NEUTROPHILS NFR BLD AUTO: 73.7 %
NITRITE URINE: NEGATIVE
PARATHYROID HORMONE INTACT: 61 PG/ML
PH URINE: 5
PHOSPHATE SERPL-MCNC: 4.5 MG/DL
PLATELET # BLD AUTO: 233 K/UL
POTASSIUM SERPL-SCNC: 5 MMOL/L
PROT SERPL-MCNC: 8 G/DL
PROTEIN URINE: NEGATIVE MG/DL
RBC # BLD: 4.7 M/UL
RBC # FLD: 15.3 %
SODIUM SERPL-SCNC: 139 MMOL/L
SPECIFIC GRAVITY URINE: 1.01
TACROLIMUS SERPL-MCNC: 10 NG/ML
TRIGL SERPL-MCNC: 126 MG/DL
TSH SERPL-ACNC: 1.43 UIU/ML
URATE SERPL-MCNC: 9.5 MG/DL
UROBILINOGEN URINE: NORMAL MG/DL
VIT B12 SERPL-MCNC: 320 PG/ML
WBC # FLD AUTO: 7.52 K/UL

## 2017-09-09 ENCOUNTER — RX RENEWAL (OUTPATIENT)
Age: 72
End: 2017-09-09

## 2017-09-18 ENCOUNTER — APPOINTMENT (OUTPATIENT)
Dept: NEPHROLOGY | Facility: CLINIC | Age: 72
End: 2017-09-18
Payer: MEDICARE

## 2017-09-18 VITALS — SYSTOLIC BLOOD PRESSURE: 143 MMHG | HEART RATE: 60 BPM | DIASTOLIC BLOOD PRESSURE: 94 MMHG

## 2017-09-18 VITALS — WEIGHT: 203 LBS | BODY MASS INDEX: 29.98 KG/M2

## 2017-09-18 PROCEDURE — 99215 OFFICE O/P EST HI 40 MIN: CPT

## 2017-09-25 ENCOUNTER — APPOINTMENT (OUTPATIENT)
Dept: NEPHROLOGY | Facility: CLINIC | Age: 72
End: 2017-09-25
Payer: MEDICARE

## 2017-09-25 VITALS — WEIGHT: 200 LBS | BODY MASS INDEX: 29.54 KG/M2

## 2017-09-25 VITALS — SYSTOLIC BLOOD PRESSURE: 116 MMHG | DIASTOLIC BLOOD PRESSURE: 68 MMHG | HEART RATE: 72 BPM

## 2017-09-25 VITALS — SYSTOLIC BLOOD PRESSURE: 115 MMHG | DIASTOLIC BLOOD PRESSURE: 79 MMHG

## 2017-09-25 PROCEDURE — 99215 OFFICE O/P EST HI 40 MIN: CPT | Mod: 25

## 2017-09-25 PROCEDURE — 90662 IIV NO PRSV INCREASED AG IM: CPT

## 2017-09-25 PROCEDURE — 36415 COLL VENOUS BLD VENIPUNCTURE: CPT

## 2017-09-25 PROCEDURE — G0008: CPT

## 2017-09-25 PROCEDURE — 93000 ELECTROCARDIOGRAM COMPLETE: CPT

## 2017-10-04 LAB
ALBUMIN SERPL ELPH-MCNC: 4.5 G/DL
ALP BLD-CCNC: 45 U/L
ALT SERPL-CCNC: 31 U/L
ANION GAP SERPL CALC-SCNC: 18 MMOL/L
AST SERPL-CCNC: 18 U/L
BASOPHILS # BLD AUTO: 0.05 K/UL
BASOPHILS NFR BLD AUTO: 0.8 %
BILIRUB SERPL-MCNC: 0.8 MG/DL
BUN SERPL-MCNC: 21 MG/DL
CALCIUM SERPL-MCNC: 9.8 MG/DL
CHLORIDE SERPL-SCNC: 96 MMOL/L
CO2 SERPL-SCNC: 25 MMOL/L
CREAT SERPL-MCNC: 1.34 MG/DL
EOSINOPHIL # BLD AUTO: 0.13 K/UL
EOSINOPHIL NFR BLD AUTO: 2 %
GLUCOSE SERPL-MCNC: 131 MG/DL
HCT VFR BLD CALC: 44 %
HGB BLD-MCNC: 13.9 G/DL
IMM GRANULOCYTES NFR BLD AUTO: 0.6 %
LYMPHOCYTES # BLD AUTO: 0.56 K/UL
LYMPHOCYTES NFR BLD AUTO: 8.6 %
MAN DIFF?: NORMAL
MCHC RBC-ENTMCNC: 31.4 PG
MCHC RBC-ENTMCNC: 31.6 GM/DL
MCV RBC AUTO: 99.3 FL
MONOCYTES # BLD AUTO: 0.92 K/UL
MONOCYTES NFR BLD AUTO: 14.2 %
NEUTROPHILS # BLD AUTO: 4.79 K/UL
NEUTROPHILS NFR BLD AUTO: 73.8 %
PLATELET # BLD AUTO: 195 K/UL
POTASSIUM SERPL-SCNC: 4.8 MMOL/L
PROT SERPL-MCNC: 7.1 G/DL
RBC # BLD: 4.43 M/UL
RBC # FLD: 15.7 %
SODIUM SERPL-SCNC: 139 MMOL/L
TACROLIMUS SERPL-MCNC: 8.2 NG/ML
WBC # FLD AUTO: 6.49 K/UL

## 2017-10-09 ENCOUNTER — RX RENEWAL (OUTPATIENT)
Age: 72
End: 2017-10-09

## 2017-10-16 ENCOUNTER — RX RENEWAL (OUTPATIENT)
Age: 72
End: 2017-10-16

## 2017-10-23 ENCOUNTER — RX RENEWAL (OUTPATIENT)
Age: 72
End: 2017-10-23

## 2017-10-25 ENCOUNTER — RECORD ABSTRACTING (OUTPATIENT)
Age: 72
End: 2017-10-25

## 2017-11-07 ENCOUNTER — RX RENEWAL (OUTPATIENT)
Age: 72
End: 2017-11-07

## 2017-11-12 ENCOUNTER — RX RENEWAL (OUTPATIENT)
Age: 72
End: 2017-11-12

## 2017-11-12 RX ORDER — LINAGLIPTIN 5 MG/1
5 TABLET, FILM COATED ORAL DAILY
Qty: 90 | Refills: 3 | Status: ACTIVE | COMMUNITY
Start: 2017-09-19 | End: 1900-01-01

## 2017-11-20 ENCOUNTER — APPOINTMENT (OUTPATIENT)
Dept: HEART AND VASCULAR | Facility: CLINIC | Age: 72
End: 2017-11-20

## 2017-11-20 ENCOUNTER — RX RENEWAL (OUTPATIENT)
Age: 72
End: 2017-11-20

## 2017-11-21 ENCOUNTER — APPOINTMENT (OUTPATIENT)
Dept: HEART AND VASCULAR | Facility: CLINIC | Age: 72
End: 2017-11-21
Payer: MEDICARE

## 2017-11-21 VITALS
BODY MASS INDEX: 28.88 KG/M2 | WEIGHT: 195 LBS | HEART RATE: 54 BPM | HEIGHT: 69 IN | SYSTOLIC BLOOD PRESSURE: 112 MMHG | DIASTOLIC BLOOD PRESSURE: 78 MMHG

## 2017-11-21 PROCEDURE — 93000 ELECTROCARDIOGRAM COMPLETE: CPT

## 2017-11-21 PROCEDURE — 99214 OFFICE O/P EST MOD 30 MIN: CPT

## 2017-11-26 ENCOUNTER — RX RENEWAL (OUTPATIENT)
Age: 72
End: 2017-11-26

## 2017-12-07 ENCOUNTER — RX RENEWAL (OUTPATIENT)
Age: 72
End: 2017-12-07

## 2017-12-10 ENCOUNTER — RX RENEWAL (OUTPATIENT)
Age: 72
End: 2017-12-10

## 2017-12-11 ENCOUNTER — RX RENEWAL (OUTPATIENT)
Age: 72
End: 2017-12-11

## 2017-12-17 ENCOUNTER — RX RENEWAL (OUTPATIENT)
Age: 72
End: 2017-12-17

## 2017-12-18 ENCOUNTER — INPATIENT (INPATIENT)
Facility: HOSPITAL | Age: 72
LOS: 1 days | Discharge: HOME | DRG: 194 | End: 2017-12-20
Attending: INTERNAL MEDICINE | Admitting: INTERNAL MEDICINE
Payer: MEDICARE

## 2017-12-18 VITALS
SYSTOLIC BLOOD PRESSURE: 161 MMHG | RESPIRATION RATE: 16 BRPM | OXYGEN SATURATION: 93 % | DIASTOLIC BLOOD PRESSURE: 88 MMHG | HEART RATE: 91 BPM | TEMPERATURE: 99 F

## 2017-12-18 DIAGNOSIS — I48.91 UNSPECIFIED ATRIAL FIBRILLATION: ICD-10-CM

## 2017-12-18 DIAGNOSIS — I25.10 ATHEROSCLEROTIC HEART DISEASE OF NATIVE CORONARY ARTERY WITHOUT ANGINA PECTORIS: ICD-10-CM

## 2017-12-18 DIAGNOSIS — Z94.0 KIDNEY TRANSPLANT STATUS: ICD-10-CM

## 2017-12-18 DIAGNOSIS — I77.0 ARTERIOVENOUS FISTULA, ACQUIRED: Chronic | ICD-10-CM

## 2017-12-18 DIAGNOSIS — J18.9 PNEUMONIA, UNSPECIFIED ORGANISM: ICD-10-CM

## 2017-12-18 DIAGNOSIS — E78.5 HYPERLIPIDEMIA, UNSPECIFIED: ICD-10-CM

## 2017-12-18 DIAGNOSIS — Z79.84 LONG TERM (CURRENT) USE OF ORAL HYPOGLYCEMIC DRUGS: ICD-10-CM

## 2017-12-18 DIAGNOSIS — E11.9 TYPE 2 DIABETES MELLITUS WITHOUT COMPLICATIONS: ICD-10-CM

## 2017-12-18 DIAGNOSIS — I10 ESSENTIAL (PRIMARY) HYPERTENSION: ICD-10-CM

## 2017-12-18 DIAGNOSIS — Z79.01 LONG TERM (CURRENT) USE OF ANTICOAGULANTS: ICD-10-CM

## 2017-12-18 DIAGNOSIS — N40.0 BENIGN PROSTATIC HYPERPLASIA WITHOUT LOWER URINARY TRACT SYMPTOMS: ICD-10-CM

## 2017-12-18 DIAGNOSIS — Z95.5 PRESENCE OF CORONARY ANGIOPLASTY IMPLANT AND GRAFT: ICD-10-CM

## 2017-12-18 DIAGNOSIS — R05 COUGH: ICD-10-CM

## 2017-12-18 DIAGNOSIS — I35.0 NONRHEUMATIC AORTIC (VALVE) STENOSIS: ICD-10-CM

## 2017-12-18 DIAGNOSIS — Z72.89 OTHER PROBLEMS RELATED TO LIFESTYLE: ICD-10-CM

## 2017-12-18 DIAGNOSIS — F17.210 NICOTINE DEPENDENCE, CIGARETTES, UNCOMPLICATED: ICD-10-CM

## 2017-12-18 DIAGNOSIS — L53.9 ERYTHEMATOUS CONDITION, UNSPECIFIED: ICD-10-CM

## 2017-12-18 DIAGNOSIS — N18.9 CHRONIC KIDNEY DISEASE, UNSPECIFIED: ICD-10-CM

## 2017-12-18 DIAGNOSIS — I13.0 HYPERTENSIVE HEART AND CHRONIC KIDNEY DISEASE WITH HEART FAILURE AND STAGE 1 THROUGH STAGE 4 CHRONIC KIDNEY DISEASE, OR UNSPECIFIED CHRONIC KIDNEY DISEASE: ICD-10-CM

## 2017-12-18 DIAGNOSIS — Z95.5 PRESENCE OF CORONARY ANGIOPLASTY IMPLANT AND GRAFT: Chronic | ICD-10-CM

## 2017-12-18 DIAGNOSIS — I50.9 HEART FAILURE, UNSPECIFIED: ICD-10-CM

## 2017-12-18 DIAGNOSIS — I50.32 CHRONIC DIASTOLIC (CONGESTIVE) HEART FAILURE: ICD-10-CM

## 2017-12-18 DIAGNOSIS — Z29.9 ENCOUNTER FOR PROPHYLACTIC MEASURES, UNSPECIFIED: ICD-10-CM

## 2017-12-18 LAB
ALBUMIN SERPL ELPH-MCNC: 4.7 G/DL — SIGNIFICANT CHANGE UP (ref 3.3–5)
ALP SERPL-CCNC: 49 U/L — SIGNIFICANT CHANGE UP (ref 40–120)
ALT FLD-CCNC: 33 U/L — SIGNIFICANT CHANGE UP (ref 10–45)
ANION GAP SERPL CALC-SCNC: 17 MMOL/L — SIGNIFICANT CHANGE UP (ref 5–17)
APPEARANCE UR: CLEAR — SIGNIFICANT CHANGE UP
AST SERPL-CCNC: 31 U/L — SIGNIFICANT CHANGE UP (ref 10–40)
BACTERIA # UR AUTO: PRESENT /HPF
BASOPHILS NFR BLD AUTO: 0.3 % — SIGNIFICANT CHANGE UP (ref 0–2)
BILIRUB SERPL-MCNC: 1 MG/DL — SIGNIFICANT CHANGE UP (ref 0.2–1.2)
BILIRUB UR-MCNC: NEGATIVE — SIGNIFICANT CHANGE UP
BUN SERPL-MCNC: 13 MG/DL — SIGNIFICANT CHANGE UP (ref 7–23)
CALCIUM SERPL-MCNC: 9.7 MG/DL — SIGNIFICANT CHANGE UP (ref 8.4–10.5)
CHLORIDE SERPL-SCNC: 99 MMOL/L — SIGNIFICANT CHANGE UP (ref 96–108)
CO2 SERPL-SCNC: 24 MMOL/L — SIGNIFICANT CHANGE UP (ref 22–31)
COLOR SPEC: YELLOW — SIGNIFICANT CHANGE UP
CREAT SERPL-MCNC: 0.93 MG/DL — SIGNIFICANT CHANGE UP (ref 0.5–1.3)
DIFF PNL FLD: (no result)
EOSINOPHIL NFR BLD AUTO: 0.3 % — SIGNIFICANT CHANGE UP (ref 0–6)
EPI CELLS # UR: (no result) /HPF (ref 0–5)
GLUCOSE BLDC GLUCOMTR-MCNC: 115 MG/DL — HIGH (ref 70–99)
GLUCOSE SERPL-MCNC: 144 MG/DL — HIGH (ref 70–99)
GLUCOSE UR QL: NEGATIVE — SIGNIFICANT CHANGE UP
HCT VFR BLD CALC: 41.2 % — SIGNIFICANT CHANGE UP (ref 39–50)
HGB BLD-MCNC: 13.6 G/DL — SIGNIFICANT CHANGE UP (ref 13–17)
KETONES UR-MCNC: NEGATIVE — SIGNIFICANT CHANGE UP
LACTATE SERPL-SCNC: 1.7 MMOL/L — SIGNIFICANT CHANGE UP (ref 0.5–2)
LEUKOCYTE ESTERASE UR-ACNC: NEGATIVE — SIGNIFICANT CHANGE UP
LYMPHOCYTES # BLD AUTO: 5.2 % — LOW (ref 13–44)
MCHC RBC-ENTMCNC: 31.1 PG — SIGNIFICANT CHANGE UP (ref 27–34)
MCHC RBC-ENTMCNC: 33 G/DL — SIGNIFICANT CHANGE UP (ref 32–36)
MCV RBC AUTO: 94.1 FL — SIGNIFICANT CHANGE UP (ref 80–100)
MONOCYTES NFR BLD AUTO: 14.9 % — HIGH (ref 2–14)
NEUTROPHILS NFR BLD AUTO: 79.3 % — HIGH (ref 43–77)
NITRITE UR-MCNC: NEGATIVE — SIGNIFICANT CHANGE UP
PH UR: 6 — SIGNIFICANT CHANGE UP (ref 5–8)
PLATELET # BLD AUTO: 191 K/UL — SIGNIFICANT CHANGE UP (ref 150–400)
POTASSIUM SERPL-MCNC: 4.9 MMOL/L — SIGNIFICANT CHANGE UP (ref 3.5–5.3)
POTASSIUM SERPL-SCNC: 4.9 MMOL/L — SIGNIFICANT CHANGE UP (ref 3.5–5.3)
PROT SERPL-MCNC: 8 G/DL — SIGNIFICANT CHANGE UP (ref 6–8.3)
PROT UR-MCNC: NEGATIVE MG/DL — SIGNIFICANT CHANGE UP
RAPID RVP RESULT: DETECTED
RBC # BLD: 4.38 M/UL — SIGNIFICANT CHANGE UP (ref 4.2–5.8)
RBC # FLD: 14.3 % — SIGNIFICANT CHANGE UP (ref 10.3–16.9)
RBC CASTS # UR COMP ASSIST: < 5 /HPF — SIGNIFICANT CHANGE UP
RV+EV RNA SPEC QL NAA+PROBE: DETECTED
SODIUM SERPL-SCNC: 140 MMOL/L — SIGNIFICANT CHANGE UP (ref 135–145)
SP GR SPEC: 1.01 — SIGNIFICANT CHANGE UP (ref 1–1.03)
UROBILINOGEN FLD QL: 0.2 E.U./DL — SIGNIFICANT CHANGE UP
WBC # BLD: 11.5 K/UL — HIGH (ref 3.8–10.5)
WBC # FLD AUTO: 11.5 K/UL — HIGH (ref 3.8–10.5)
WBC UR QL: (no result) /HPF

## 2017-12-18 PROCEDURE — 71250 CT THORAX DX C-: CPT | Mod: 26

## 2017-12-18 PROCEDURE — 99285 EMERGENCY DEPT VISIT HI MDM: CPT

## 2017-12-18 PROCEDURE — 71020: CPT | Mod: 26

## 2017-12-18 RX ORDER — FAMOTIDINE 10 MG/ML
20 INJECTION INTRAVENOUS DAILY
Qty: 0 | Refills: 0 | Status: DISCONTINUED | OUTPATIENT
Start: 2017-12-18 | End: 2017-12-20

## 2017-12-18 RX ORDER — DEXTROSE 50 % IN WATER 50 %
12.5 SYRINGE (ML) INTRAVENOUS ONCE
Qty: 0 | Refills: 0 | Status: DISCONTINUED | OUTPATIENT
Start: 2017-12-18 | End: 2017-12-20

## 2017-12-18 RX ORDER — VALSARTAN 80 MG/1
320 TABLET ORAL DAILY
Qty: 0 | Refills: 0 | Status: DISCONTINUED | OUTPATIENT
Start: 2017-12-18 | End: 2017-12-20

## 2017-12-18 RX ORDER — AZITHROMYCIN 500 MG/1
250 TABLET, FILM COATED ORAL DAILY
Qty: 0 | Refills: 0 | Status: DISCONTINUED | OUTPATIENT
Start: 2017-12-19 | End: 2017-12-19

## 2017-12-18 RX ORDER — LATANOPROST 0.05 MG/ML
1 SOLUTION/ DROPS OPHTHALMIC; TOPICAL AT BEDTIME
Qty: 0 | Refills: 0 | Status: DISCONTINUED | OUTPATIENT
Start: 2017-12-18 | End: 2017-12-20

## 2017-12-18 RX ORDER — CEFTRIAXONE 500 MG/1
1 INJECTION, POWDER, FOR SOLUTION INTRAMUSCULAR; INTRAVENOUS ONCE
Qty: 0 | Refills: 0 | Status: COMPLETED | OUTPATIENT
Start: 2017-12-18 | End: 2017-12-18

## 2017-12-18 RX ORDER — DILTIAZEM HCL 120 MG
300 CAPSULE, EXT RELEASE 24 HR ORAL DAILY
Qty: 0 | Refills: 0 | Status: DISCONTINUED | OUTPATIENT
Start: 2017-12-18 | End: 2017-12-20

## 2017-12-18 RX ORDER — SODIUM CHLORIDE 9 MG/ML
1000 INJECTION, SOLUTION INTRAVENOUS
Qty: 0 | Refills: 0 | Status: DISCONTINUED | OUTPATIENT
Start: 2017-12-18 | End: 2017-12-20

## 2017-12-18 RX ORDER — TACROLIMUS 5 MG/1
1 CAPSULE ORAL
Qty: 0 | Refills: 0 | Status: DISCONTINUED | OUTPATIENT
Start: 2017-12-18 | End: 2017-12-20

## 2017-12-18 RX ORDER — DEXTROSE 50 % IN WATER 50 %
25 SYRINGE (ML) INTRAVENOUS ONCE
Qty: 0 | Refills: 0 | Status: DISCONTINUED | OUTPATIENT
Start: 2017-12-18 | End: 2017-12-20

## 2017-12-18 RX ORDER — ATORVASTATIN CALCIUM 80 MG/1
10 TABLET, FILM COATED ORAL AT BEDTIME
Qty: 0 | Refills: 0 | Status: DISCONTINUED | OUTPATIENT
Start: 2017-12-18 | End: 2017-12-20

## 2017-12-18 RX ORDER — METOPROLOL TARTRATE 50 MG
50 TABLET ORAL DAILY
Qty: 0 | Refills: 0 | Status: DISCONTINUED | OUTPATIENT
Start: 2017-12-18 | End: 2017-12-20

## 2017-12-18 RX ORDER — ASPIRIN/CALCIUM CARB/MAGNESIUM 324 MG
81 TABLET ORAL DAILY
Qty: 0 | Refills: 0 | Status: DISCONTINUED | OUTPATIENT
Start: 2017-12-18 | End: 2017-12-19

## 2017-12-18 RX ORDER — AZITHROMYCIN 500 MG/1
500 TABLET, FILM COATED ORAL ONCE
Qty: 0 | Refills: 0 | Status: COMPLETED | OUTPATIENT
Start: 2017-12-18 | End: 2017-12-18

## 2017-12-18 RX ORDER — HEPARIN SODIUM 5000 [USP'U]/ML
5000 INJECTION INTRAVENOUS; SUBCUTANEOUS EVERY 8 HOURS
Qty: 0 | Refills: 0 | Status: DISCONTINUED | OUTPATIENT
Start: 2017-12-18 | End: 2017-12-19

## 2017-12-18 RX ORDER — TIMOLOL 0.5 %
1 DROPS OPHTHALMIC (EYE)
Qty: 0 | Refills: 0 | Status: DISCONTINUED | OUTPATIENT
Start: 2017-12-18 | End: 2017-12-20

## 2017-12-18 RX ORDER — CHOLECALCIFEROL (VITAMIN D3) 125 MCG
1000 CAPSULE ORAL DAILY
Qty: 0 | Refills: 0 | Status: DISCONTINUED | OUTPATIENT
Start: 2017-12-18 | End: 2017-12-20

## 2017-12-18 RX ORDER — TACROLIMUS 5 MG/1
1.5 CAPSULE ORAL
Qty: 0 | Refills: 0 | COMMUNITY

## 2017-12-18 RX ORDER — BRIMONIDINE TARTRATE 2 MG/MG
1 SOLUTION/ DROPS OPHTHALMIC
Qty: 0 | Refills: 0 | Status: DISCONTINUED | OUTPATIENT
Start: 2017-12-18 | End: 2017-12-20

## 2017-12-18 RX ORDER — GLUCAGON INJECTION, SOLUTION 0.5 MG/.1ML
1 INJECTION, SOLUTION SUBCUTANEOUS ONCE
Qty: 0 | Refills: 0 | Status: DISCONTINUED | OUTPATIENT
Start: 2017-12-18 | End: 2017-12-20

## 2017-12-18 RX ORDER — INSULIN LISPRO 100/ML
VIAL (ML) SUBCUTANEOUS
Qty: 0 | Refills: 0 | Status: DISCONTINUED | OUTPATIENT
Start: 2017-12-18 | End: 2017-12-20

## 2017-12-18 RX ORDER — DOCUSATE SODIUM 100 MG
100 CAPSULE ORAL
Qty: 0 | Refills: 0 | Status: DISCONTINUED | OUTPATIENT
Start: 2017-12-18 | End: 2017-12-20

## 2017-12-18 RX ORDER — DEXTROSE 50 % IN WATER 50 %
1 SYRINGE (ML) INTRAVENOUS ONCE
Qty: 0 | Refills: 0 | Status: DISCONTINUED | OUTPATIENT
Start: 2017-12-18 | End: 2017-12-20

## 2017-12-18 RX ORDER — CEFTRIAXONE 500 MG/1
1 INJECTION, POWDER, FOR SOLUTION INTRAMUSCULAR; INTRAVENOUS EVERY 24 HOURS
Qty: 0 | Refills: 0 | Status: DISCONTINUED | OUTPATIENT
Start: 2017-12-19 | End: 2017-12-20

## 2017-12-18 RX ORDER — DABIGATRAN ETEXILATE MESYLATE 150 MG/1
150 CAPSULE ORAL EVERY 12 HOURS
Qty: 0 | Refills: 0 | Status: DISCONTINUED | OUTPATIENT
Start: 2017-12-18 | End: 2017-12-20

## 2017-12-18 RX ORDER — THIAMINE MONONITRATE (VIT B1) 100 MG
100 TABLET ORAL DAILY
Qty: 0 | Refills: 0 | Status: DISCONTINUED | OUTPATIENT
Start: 2017-12-18 | End: 2017-12-20

## 2017-12-18 RX ORDER — FUROSEMIDE 40 MG
40 TABLET ORAL DAILY
Qty: 0 | Refills: 0 | Status: DISCONTINUED | OUTPATIENT
Start: 2017-12-18 | End: 2017-12-20

## 2017-12-18 RX ADMIN — ATORVASTATIN CALCIUM 10 MILLIGRAM(S): 80 TABLET, FILM COATED ORAL at 23:13

## 2017-12-18 RX ADMIN — AZITHROMYCIN 255 MILLIGRAM(S): 500 TABLET, FILM COATED ORAL at 19:03

## 2017-12-18 RX ADMIN — CEFTRIAXONE 100 GRAM(S): 500 INJECTION, POWDER, FOR SOLUTION INTRAMUSCULAR; INTRAVENOUS at 18:38

## 2017-12-18 NOTE — H&P ADULT - PROBLEM SELECTOR PLAN 3
S/p WALLY to distal and proximal LCX. Known to Dr Becker  -C/w ASA, lipitor, toprol, valsartan  -F/u lipid panel (patient only on 10mg lipitor) S/p WALLY to distal and proximal LCX. Known to Dr Becker  -C/w ASA, lipitor, toprol, valsartan  -F/u lipid panel (patient only on 10mg lipitor)  -Clarify if this is the correct regimen as dc note after PCI said to take ASA for one month and take plavix/pradaxa for 3 months.

## 2017-12-18 NOTE — H&P ADULT - PMH
Aortic stenosis    Atrial fibrillation  Atrial fibrillation  AV fistula  Left forearm  Complication of transplanted kidney  s/p right sided renal transplant  Congestive heart failure    Coronary artery disease    Essential hypertension  HTN (hypertension)  Hyperlipidemia  Hyperlipidemia  Type 2 diabetes mellitus  Diabetes

## 2017-12-18 NOTE — H&P ADULT - HISTORY OF PRESENT ILLNESS
Patient is a 72 year old male, poor historian, with a PMHx of kidney transplant 2011, dCHF, AS, Afib on pradaxa, CAD s/p WALLY to dLCx/pLCx (1/2017), HTN, HLD, BPH, DM  who presents with 4 months of cough. He describes the cough as typically non-productive although noted "dark phlegm" the past few days which he attributed to his cough medicine. The patient discovered that he had a fever today of 102, which prompted him to come to the ED. ROS negative for orthopnea, PND, LE swelling, CP, palpitations or diarrhea but notes decreased appetite over the past several days.     In the ED, VS were T (oral) 98.6 HR 91 /8  RR 16 O2 93 on RA. He was given ceftriaxone and azithromycin Patient is a 72 year old male, poor historian, with a PMHx of kidney transplant 2011, dCHF, AS, Afib on pradaxa, CAD s/p WALLY to dLCx/pLCx (1/2017), HTN, HLD, BPH, DM and active smoker/drinker who presents with 4 months of cough. He describes the cough as typically non-productive although noted "dark phlegm" the past few days which he attributed to his cough medicine. The patient discovered that he had a fever today of 102, which prompted him to come to the ED. ROS negative for orthopnea, PND, LE swelling, CP, palpitations or diarrhea but notes decreased appetite over the past several days.     In the ED, VS were T (oral) 98.6 HR 91 /8  RR 16 O2 93 on RA. He was given ceftriaxone and azithromycin

## 2017-12-18 NOTE — H&P ADULT - ASSESSMENT
Patient is a 72 year old male, poor historian, with a PMHx of kidney transplant 2011, dCHF, AS, Afib on pradaxa, CAD s/p WALLY to dLCx/pLCx (1/2017), HTN, HLD, BPH, DM and active smoker/drinker who presents with CAP

## 2017-12-18 NOTE — ED PROVIDER NOTE - MEDICAL DECISION MAKING DETAILS
Pt afVSS well appearing, breathing easily, CXR with RLL PNA present, discussed with Dr. Jason and given transplant will admit continue IV abx and CT chest.

## 2017-12-18 NOTE — ED ADULT NURSE NOTE - OBJECTIVE STATEMENT
pt presents to ED A&Ox3 c/o fever this morning of 101. pt also reports cough x a few months. denies cp/sob, n/v/d, headache. hx of kidney transplant 7 yrs ago.

## 2017-12-18 NOTE — H&P ADULT - NSHPREVIEWOFSYSTEMS_GEN_ALL_CORE
CONSTITUTIONAL: +fevers and chills  EYES/ENT: throat pain, No visual changes;  No vertigo  NECK: No pain or stiffness  RESPIRATORY: +cough, no wheezing, hemoptysis; No shortness of breath  CARDIOVASCULAR: No chest pain or palpitations  GASTROINTESTINAL: No abdominal or epigastric pain. No nausea, vomiting, or hematemesis; No diarrhea or constipation. No melena or hematochezia.  GENITOURINARY: No dysuria, frequency or hematuria  NEUROLOGICAL: No numbness or weakness  SKIN: No itching, burning, rashes, or lesions   All other review of systems is negative unless indicated above.

## 2017-12-18 NOTE — ED ADULT NURSE NOTE - PMH
Atrial fibrillation  Atrial fibrillation  AV fistula  Left forearm  Complication of transplanted kidney  s/p right sided renal transplant  Essential hypertension  HTN (hypertension)  Hyperlipidemia  Hyperlipidemia  Type 2 diabetes mellitus  Diabetes

## 2017-12-18 NOTE — ED PROVIDER NOTE - OBJECTIVE STATEMENT
73yo man with h/o A-fib, HTN, DM, and s/p kidney transplant many years prior c/o cough for the past 4 months and fever to 101 this morning. Pt denies CP, no SOB, no abdominal pain and no urinary sx's. Pt states cough is productive and he has been compliant with all of his medications.

## 2017-12-18 NOTE — ED PROVIDER NOTE - PHYSICAL EXAMINATION
CONSTITUTIONAL: Well-appearing; well-nourished; in no apparent distress.   HEAD: Normocephalic; atraumatic.   EYES: PERRL; EOM intact; conjunctiva and sclera clear  ENT: normal nose; no rhinorrhea; normal pharynx with no erythema or lesions.   NECK: Supple; non-tender;   CARDIOVASCULAR: Normal S1, S2; +DAVID. Irregular rate and rhythm.   RESPIRATORY: Breathing easily; + rhonchi to the right mid to lower lung, BS bilaterally.   GI: Soft; non-distended; non-tender;   EXT: No cyanosis or edema; N/V intact  SKIN: Normal for age and race; warm; dry; good turgor;   NEURO: A & O x 3; face symmetric; grossly unremarkable.   PSYCHOLOGICAL: The patient’s mood and manner are appropriate.

## 2017-12-18 NOTE — H&P ADULT - PROBLEM SELECTOR PLAN 1
Patient did not meet sepsis criteria in the ED, but noted a temp of 102 at home. CXR remarkable for a RLL infiltrate and an O2 sat of 92, however that may be his baseline given his extensive smoking history. S/p ceftriaxone and azithromycin in ED  -C/w cef/azithro for CAP coverage  -CT chest at request of Dr Jason

## 2017-12-18 NOTE — ED ADULT TRIAGE NOTE - CHIEF COMPLAINT QUOTE
Fever this am (patient is a kideny transplant patient). Patient endorses taking Tylenol. Patient BIBA for eval.

## 2017-12-18 NOTE — H&P ADULT - NSHPPHYSICALEXAM_GEN_ALL_CORE
Constitutional: WDWN resting comfortably in bed; NAD  Head: NC/AT  Eyes: PERRL, EOMI, anicteric sclera  ENT: no nasal discharge; uvula midline with purple discoloration, no oropharyngeal erythema or exudates; dry MM  Neck: supple; no JVD or thyromegaly  Respiratory: decreased BS or RLL, no egophany. otherwise clear lungs, no increased WOB  Cardiac: +systolic murmur  Gastrointestinal: soft, NT/ND; no rebound or guarding; +BSx4  Back: spine midline, no bony tenderness or step-offs; no CVAT B/L  Extremities: WWP, no clubbing or cyanosis; no peripheral edema  Musculoskeletal: NROM x4; no joint swelling, tenderness or erythema  Vascular: 2+ radial, femoral, DP/PT pulses B/L  Dermatologic: skin warm, dry and intact; no rashes, wounds, or scars  Lymphatic: no submandibular or cervical LAD  Neurologic: AAOx3; CNII-XII grossly intact; no focal deficits  Psychiatric: affect and characteristics of appearance, verbalizations, behaviors are appropriate Constitutional: WDWN resting comfortably in bed; NAD  Head: NC/AT  Eyes: PERRL, EOMI, anicteric sclera  ENT: no nasal discharge; uvula midline with purple discoloration, no oropharyngeal erythema or exudates; dry MM  Neck: supple; no JVD or thyromegaly  Respiratory: decreased BS or RLL, no egophany. otherwise clear lungs, no increased WOB  Cardiac: +systolic murmur  Gastrointestinal: soft, NT/ND; no rebound or guarding; +BSx4  Back: spine midline, no bony tenderness or step-offs; no CVAT B/L  Extremities: WWP, no clubbing or cyanosis; no peripheral edema  Musculoskeletal: NROM x4; no joint swelling, tenderness or erythema  Vascular: 2+ radial, femoral, DP/PT pulses B/L. LUE fistula  Dermatologic: skin warm, dry and intact; no rashes, wounds, or scars  Lymphatic: no submandibular or cervical LAD  Neurologic: AAOx3; CNII-XII grossly intact; no focal deficits  Psychiatric: affect and characteristics of appearance, verbalizations, behaviors are appropriate

## 2017-12-18 NOTE — H&P ADULT - NSHPLABSRESULTS_GEN_ALL_CORE
13.6   11.5  )-----------( 191      ( 18 Dec 2017 17:33 )             41.2         140  |  99  |  13  ----------------------------<  144<H>  4.9   |  24  |  0.93    Ca    9.7      18 Dec 2017 17:33    TPro  8.0  /  Alb  4.7  /  TBili  1.0  /  DBili  x   /  AST  31  /  ALT  33  /  AlkPhos  49        Urinalysis Basic - ( 18 Dec 2017 17:23 )    Color: Yellow / Appearance: Clear / S.010 / pH: x  Gluc: x / Ketone: NEGATIVE  / Bili: Negative / Urobili: 0.2 E.U./dL   Blood: x / Protein: NEGATIVE mg/dL / Nitrite: NEGATIVE   Leuk Esterase: NEGATIVE / RBC: < 5 /HPF / WBC 5-10 /HPF   Sq Epi: x / Non Sq Epi: Moderate /HPF / Bacteria: Present /HPF    Lactate, Blood: 1.7 mmoL/L ( @ 17:32)      RADIOLOGY, EKG & ADDITIONAL TESTS: Reviewed.   CXR: RLL infiltrate

## 2017-12-18 NOTE — H&P ADULT - PROBLEM SELECTOR PLAN 4
Does not appear to be in exacerbation. Cough likely 2/2 PNA, so no need for echo at this time  -C/w lasix, toprol, valsartan  -Patient is insure if he still needs to take potassium supplementation

## 2017-12-18 NOTE — H&P ADULT - PROBLEM SELECTOR PLAN 8
Patient unclear of etiology of kidney failure. Was on dialysis but now urinating with normal BUN/Cr  -C/w prograf  -Patient is unsure if he is still taking cellcept, clarify in AM

## 2017-12-18 NOTE — H&P ADULT - PSH
AVF (arteriovenous fistula)    History of kidney transplant  right  S/P primary angioplasty with coronary stent

## 2017-12-18 NOTE — H&P ADULT - NSHPSOCIALHISTORY_GEN_ALL_CORE
Smokes 1ppd for >55 years  Drinks 200g vodka daily. Denies hx of withdrawal, seizures, or hospitalizations

## 2017-12-19 LAB
ANION GAP SERPL CALC-SCNC: 13 MMOL/L — SIGNIFICANT CHANGE UP (ref 5–17)
BASOPHILS NFR BLD AUTO: 0.3 % — SIGNIFICANT CHANGE UP (ref 0–2)
BUN SERPL-MCNC: 11 MG/DL — SIGNIFICANT CHANGE UP (ref 7–23)
CALCIUM SERPL-MCNC: 9.4 MG/DL — SIGNIFICANT CHANGE UP (ref 8.4–10.5)
CHLORIDE SERPL-SCNC: 98 MMOL/L — SIGNIFICANT CHANGE UP (ref 96–108)
CHOLEST SERPL-MCNC: 112 MG/DL — SIGNIFICANT CHANGE UP (ref 10–199)
CO2 SERPL-SCNC: 28 MMOL/L — SIGNIFICANT CHANGE UP (ref 22–31)
CREAT SERPL-MCNC: 0.95 MG/DL — SIGNIFICANT CHANGE UP (ref 0.5–1.3)
EOSINOPHIL NFR BLD AUTO: 1.1 % — SIGNIFICANT CHANGE UP (ref 0–6)
GLUCOSE SERPL-MCNC: 181 MG/DL — HIGH (ref 70–99)
HBA1C BLD-MCNC: 6.2 % — HIGH (ref 4–5.6)
HCT VFR BLD CALC: 38.4 % — LOW (ref 39–50)
HDLC SERPL-MCNC: 39 MG/DL — LOW (ref 40–125)
HGB BLD-MCNC: 12.7 G/DL — LOW (ref 13–17)
LIPID PNL WITH DIRECT LDL SERPL: 51 MG/DL — SIGNIFICANT CHANGE UP
LYMPHOCYTES # BLD AUTO: 5.5 % — LOW (ref 13–44)
MAGNESIUM SERPL-MCNC: 1.5 MG/DL — LOW (ref 1.6–2.6)
MCHC RBC-ENTMCNC: 31.4 PG — SIGNIFICANT CHANGE UP (ref 27–34)
MCHC RBC-ENTMCNC: 33.1 G/DL — SIGNIFICANT CHANGE UP (ref 32–36)
MCV RBC AUTO: 95 FL — SIGNIFICANT CHANGE UP (ref 80–100)
MONOCYTES NFR BLD AUTO: 16.9 % — HIGH (ref 2–14)
NEUTROPHILS NFR BLD AUTO: 76.2 % — SIGNIFICANT CHANGE UP (ref 43–77)
PLATELET # BLD AUTO: 165 K/UL — SIGNIFICANT CHANGE UP (ref 150–400)
POTASSIUM SERPL-MCNC: 4.1 MMOL/L — SIGNIFICANT CHANGE UP (ref 3.5–5.3)
POTASSIUM SERPL-SCNC: 4.1 MMOL/L — SIGNIFICANT CHANGE UP (ref 3.5–5.3)
RBC # BLD: 4.04 M/UL — LOW (ref 4.2–5.8)
RBC # FLD: 14.5 % — SIGNIFICANT CHANGE UP (ref 10.3–16.9)
SODIUM SERPL-SCNC: 139 MMOL/L — SIGNIFICANT CHANGE UP (ref 135–145)
TOTAL CHOLESTEROL/HDL RATIO MEASUREMENT: 2.9 RATIO — LOW (ref 3.4–9.6)
TRIGL SERPL-MCNC: 110 MG/DL — SIGNIFICANT CHANGE UP (ref 10–149)
WBC # BLD: 7.2 K/UL — SIGNIFICANT CHANGE UP (ref 3.8–10.5)
WBC # FLD AUTO: 7.2 K/UL — SIGNIFICANT CHANGE UP (ref 3.8–10.5)

## 2017-12-19 PROCEDURE — 99223 1ST HOSP IP/OBS HIGH 75: CPT | Mod: AI

## 2017-12-19 RX ORDER — MAGNESIUM SULFATE 500 MG/ML
2 VIAL (ML) INJECTION ONCE
Qty: 0 | Refills: 0 | Status: COMPLETED | OUTPATIENT
Start: 2017-12-19 | End: 2017-12-19

## 2017-12-19 RX ORDER — MYCOPHENOLIC ACID 180 MG/1
360 TABLET, DELAYED RELEASE ORAL
Qty: 0 | Refills: 0 | Status: DISCONTINUED | OUTPATIENT
Start: 2017-12-19 | End: 2017-12-19

## 2017-12-19 RX ORDER — CLOPIDOGREL BISULFATE 75 MG/1
75 TABLET, FILM COATED ORAL DAILY
Qty: 0 | Refills: 0 | Status: DISCONTINUED | OUTPATIENT
Start: 2017-12-19 | End: 2017-12-20

## 2017-12-19 RX ORDER — MYCOPHENOLIC ACID 180 MG/1
720 TABLET, DELAYED RELEASE ORAL
Qty: 0 | Refills: 0 | Status: DISCONTINUED | OUTPATIENT
Start: 2017-12-19 | End: 2017-12-20

## 2017-12-19 RX ORDER — AZITHROMYCIN 500 MG/1
250 TABLET, FILM COATED ORAL EVERY 24 HOURS
Qty: 0 | Refills: 0 | Status: DISCONTINUED | OUTPATIENT
Start: 2017-12-19 | End: 2017-12-20

## 2017-12-19 RX ORDER — MYCOPHENOLATE MOFETIL 250 MG/1
360 CAPSULE ORAL
Qty: 0 | Refills: 0 | Status: DISCONTINUED | OUTPATIENT
Start: 2017-12-19 | End: 2017-12-19

## 2017-12-19 RX ADMIN — Medication 100 MILLIGRAM(S): at 11:41

## 2017-12-19 RX ADMIN — Medication 50 GRAM(S): at 10:13

## 2017-12-19 RX ADMIN — ATORVASTATIN CALCIUM 10 MILLIGRAM(S): 80 TABLET, FILM COATED ORAL at 21:34

## 2017-12-19 RX ADMIN — MYCOPHENOLIC ACID 720 MILLIGRAM(S): 180 TABLET, DELAYED RELEASE ORAL at 15:21

## 2017-12-19 RX ADMIN — DABIGATRAN ETEXILATE MESYLATE 150 MILLIGRAM(S): 150 CAPSULE ORAL at 18:44

## 2017-12-19 RX ADMIN — AZITHROMYCIN 250 MILLIGRAM(S): 500 TABLET, FILM COATED ORAL at 19:38

## 2017-12-19 RX ADMIN — Medication 100 MILLIGRAM(S): at 07:57

## 2017-12-19 RX ADMIN — Medication 1000 UNIT(S): at 11:41

## 2017-12-19 RX ADMIN — FAMOTIDINE 20 MILLIGRAM(S): 10 INJECTION INTRAVENOUS at 11:41

## 2017-12-19 RX ADMIN — TACROLIMUS 1 MILLIGRAM(S): 5 CAPSULE ORAL at 08:43

## 2017-12-19 RX ADMIN — CLOPIDOGREL BISULFATE 75 MILLIGRAM(S): 75 TABLET, FILM COATED ORAL at 11:44

## 2017-12-19 RX ADMIN — CEFTRIAXONE 100 GRAM(S): 500 INJECTION, POWDER, FOR SOLUTION INTRAMUSCULAR; INTRAVENOUS at 19:38

## 2017-12-19 RX ADMIN — Medication 300 MILLIGRAM(S): at 07:56

## 2017-12-19 RX ADMIN — LATANOPROST 1 DROP(S): 0.05 SOLUTION/ DROPS OPHTHALMIC; TOPICAL at 00:40

## 2017-12-19 RX ADMIN — VALSARTAN 320 MILLIGRAM(S): 80 TABLET ORAL at 07:58

## 2017-12-19 RX ADMIN — HEPARIN SODIUM 5000 UNIT(S): 5000 INJECTION INTRAVENOUS; SUBCUTANEOUS at 07:55

## 2017-12-19 RX ADMIN — Medication 50 MILLIGRAM(S): at 07:57

## 2017-12-19 RX ADMIN — Medication 40 MILLIGRAM(S): at 07:57

## 2017-12-19 RX ADMIN — TACROLIMUS 1 MILLIGRAM(S): 5 CAPSULE ORAL at 18:44

## 2017-12-19 RX ADMIN — Medication 1 DROP(S): at 18:44

## 2017-12-19 RX ADMIN — Medication 100 MILLIGRAM(S): at 18:44

## 2017-12-19 RX ADMIN — DABIGATRAN ETEXILATE MESYLATE 150 MILLIGRAM(S): 150 CAPSULE ORAL at 07:59

## 2017-12-19 RX ADMIN — BRIMONIDINE TARTRATE 1 DROP(S): 2 SOLUTION/ DROPS OPHTHALMIC at 18:44

## 2017-12-19 NOTE — DIETITIAN INITIAL EVALUATION ADULT. - NS AS NUTRI DX KNOWLEDGE BELIEFS
Unsupported beliefs/attitudes about food or nutrition-relate/Not ready for diet/lifestyle change/Food - and nutrition - related knowledge deficit/Limited adherence to nutrition - related recommendations

## 2017-12-19 NOTE — DIETITIAN INITIAL EVALUATION ADULT. - OTHER INFO
73yo male with diet consult for questions regarding prescribed diet. Dx: pneumonia, fever. PMH: CHF, aortic stenosis, CAD, AV fistula, HLD, HTN, Afib, T2DM, kidney transplant. History of cigharette and alcohol use. Pt reported poor appetite secondary to consistent cough. PO intake ~100%. Observed pt post breakfast, stated that he did not eat his food because he did not receive a Kosher tray. Pt stated that his usual body weight has always been 193#, and that just last week he was weighed. Request for re-weight, as pt physical appearance did not appear consistent with admission wt 148#. No therapeutic diet adherence at home. Wife is the primary cook. Provided the pt with diet education for consistent CHO and DASH/TLC diet, however pt was not open to education or lifestyle modifications. Left diet education handouts at bedside and recommended pt share with his wife. No reported N/V/D/C, and no difficulties with chewing and swallowing. Skin: WDL except ashen color and abrasion at BL shin, melonie 19. No food allergies and no supplements taken at home. 73yo male with diet consult for questions regarding prescribed diet. Dx: pneumonia, fever. PMH: CHF, aortic stenosis, CAD, AV fistula, HLD, HTN, Afib, T2DM, kidney transplant. History of cigharette and alcohol use. Pt reported poor appetite secondary to consistent cough. Despite lack of appetite, stated PO intake ~100%. Observed pt post breakfast, stated that he did not eat his food because he did not receive a Kosher tray. Pt stated that his usual body weight has always been 193#, and that just last week he was weighed. Request for re-weight, as pt physical appearance did not appear consistent with admission wt 148#. No therapeutic diet adherence at home. Wife is the primary cook.  No reported N/V/D/C, and no difficulties with chewing and swallowing. Skin: WDL except ashen color and abrasion at BL shin, melonie 19. No food allergies and no supplements taken at home. Provided the pt with diet education for consistent CHO and DASH/TLC diet, however pt was not open to education or lifestyle modifications. Left diet education handouts at bedside and recommended pt share with his wife.

## 2017-12-19 NOTE — DIETITIAN INITIAL EVALUATION ADULT. - ENERGY NEEDS
Ht: 65", Wt: 148#, BMI: 22.6, IBW: 154#, %IBW: 96% (used admission wt because %IBW<120%) Ht: 65", Wt: 193#, BMI: 32, IBW: 154#, %IBW: 125% (used IBW because %IBW>120%)

## 2017-12-19 NOTE — DIETITIAN INITIAL EVALUATION ADULT. - PROBLEM SELECTOR PLAN 3
S/p WALLY to distal and proximal LCX. Known to Dr Becker  -C/w ASA, lipitor, toprol, valsartan  -F/u lipid panel (patient only on 10mg lipitor)  -Clarify if this is the correct regimen as dc note after PCI said to take ASA for one month and take plavix/pradaxa for 3 months.

## 2017-12-19 NOTE — DIETITIAN INITIAL EVALUATION ADULT. - NUTRITIONGOAL OUTCOME1
Maintenance of A1C>7%, pt able to recall carbohydrate foods, therapeutic diet adherence Maintenance of A1C<7%, pt able to recall carbohydrate foods, therapeutic diet adherence

## 2017-12-19 NOTE — DIETITIAN INITIAL EVALUATION ADULT. - PERTINENT MEDS FT
aspirin, pradaxa, heparin, magnesium sulfate, lipitor, vit D3, diltiazem, colace, pepcid, lasix, glucagon, humalog, metoprolol, tacrolimus, thiamine, timolol, valsartan aspirin, pradaxa, heparin, magnesium sulfate, lipitor, vit D3, diltiazem, colace, pepcid, lasix, glucagon, humalog, metoprolol, tacrolimus, thiamine, valsartan

## 2017-12-20 ENCOUNTER — TRANSCRIPTION ENCOUNTER (OUTPATIENT)
Age: 72
End: 2017-12-20

## 2017-12-20 VITALS
HEART RATE: 66 BPM | OXYGEN SATURATION: 96 % | RESPIRATION RATE: 18 BRPM | TEMPERATURE: 99 F | SYSTOLIC BLOOD PRESSURE: 145 MMHG | DIASTOLIC BLOOD PRESSURE: 76 MMHG

## 2017-12-20 DIAGNOSIS — L53.9 ERYTHEMATOUS CONDITION, UNSPECIFIED: ICD-10-CM

## 2017-12-20 LAB
ANION GAP SERPL CALC-SCNC: 13 MMOL/L — SIGNIFICANT CHANGE UP (ref 5–17)
BUN SERPL-MCNC: 14 MG/DL — SIGNIFICANT CHANGE UP (ref 7–23)
CALCIUM SERPL-MCNC: 9.5 MG/DL — SIGNIFICANT CHANGE UP (ref 8.4–10.5)
CHLORIDE SERPL-SCNC: 99 MMOL/L — SIGNIFICANT CHANGE UP (ref 96–108)
CO2 SERPL-SCNC: 27 MMOL/L — SIGNIFICANT CHANGE UP (ref 22–31)
CREAT SERPL-MCNC: 0.89 MG/DL — SIGNIFICANT CHANGE UP (ref 0.5–1.3)
GLUCOSE SERPL-MCNC: 159 MG/DL — HIGH (ref 70–99)
HCT VFR BLD CALC: 39.2 % — SIGNIFICANT CHANGE UP (ref 39–50)
HGB BLD-MCNC: 12.6 G/DL — LOW (ref 13–17)
MAGNESIUM SERPL-MCNC: 1.9 MG/DL — SIGNIFICANT CHANGE UP (ref 1.6–2.6)
MCHC RBC-ENTMCNC: 30.5 PG — SIGNIFICANT CHANGE UP (ref 27–34)
MCHC RBC-ENTMCNC: 32.1 G/DL — SIGNIFICANT CHANGE UP (ref 32–36)
MCV RBC AUTO: 94.9 FL — SIGNIFICANT CHANGE UP (ref 80–100)
PLATELET # BLD AUTO: 198 K/UL — SIGNIFICANT CHANGE UP (ref 150–400)
POTASSIUM SERPL-MCNC: 4.2 MMOL/L — SIGNIFICANT CHANGE UP (ref 3.5–5.3)
POTASSIUM SERPL-SCNC: 4.2 MMOL/L — SIGNIFICANT CHANGE UP (ref 3.5–5.3)
RBC # BLD: 4.13 M/UL — LOW (ref 4.2–5.8)
RBC # FLD: 14.3 % — SIGNIFICANT CHANGE UP (ref 10.3–16.9)
SODIUM SERPL-SCNC: 139 MMOL/L — SIGNIFICANT CHANGE UP (ref 135–145)
WBC # BLD: 7.6 K/UL — SIGNIFICANT CHANGE UP (ref 3.8–10.5)
WBC # FLD AUTO: 7.6 K/UL — SIGNIFICANT CHANGE UP (ref 3.8–10.5)

## 2017-12-20 PROCEDURE — 85025 COMPLETE CBC W/AUTO DIFF WBC: CPT

## 2017-12-20 PROCEDURE — 82962 GLUCOSE BLOOD TEST: CPT

## 2017-12-20 PROCEDURE — 83605 ASSAY OF LACTIC ACID: CPT

## 2017-12-20 PROCEDURE — 87581 M.PNEUMON DNA AMP PROBE: CPT

## 2017-12-20 PROCEDURE — 83036 HEMOGLOBIN GLYCOSYLATED A1C: CPT

## 2017-12-20 PROCEDURE — 36415 COLL VENOUS BLD VENIPUNCTURE: CPT

## 2017-12-20 PROCEDURE — 81001 URINALYSIS AUTO W/SCOPE: CPT

## 2017-12-20 PROCEDURE — 85027 COMPLETE CBC AUTOMATED: CPT

## 2017-12-20 PROCEDURE — 80048 BASIC METABOLIC PNL TOTAL CA: CPT

## 2017-12-20 PROCEDURE — 87486 CHLMYD PNEUM DNA AMP PROBE: CPT

## 2017-12-20 PROCEDURE — 71046 X-RAY EXAM CHEST 2 VIEWS: CPT

## 2017-12-20 PROCEDURE — 87449 NOS EACH ORGANISM AG IA: CPT

## 2017-12-20 PROCEDURE — 80053 COMPREHEN METABOLIC PANEL: CPT

## 2017-12-20 PROCEDURE — 99285 EMERGENCY DEPT VISIT HI MDM: CPT | Mod: 25

## 2017-12-20 PROCEDURE — 80061 LIPID PANEL: CPT

## 2017-12-20 PROCEDURE — 71250 CT THORAX DX C-: CPT

## 2017-12-20 PROCEDURE — 87633 RESP VIRUS 12-25 TARGETS: CPT

## 2017-12-20 PROCEDURE — 99238 HOSP IP/OBS DSCHRG MGMT 30/<: CPT

## 2017-12-20 PROCEDURE — 87798 DETECT AGENT NOS DNA AMP: CPT

## 2017-12-20 PROCEDURE — 83735 ASSAY OF MAGNESIUM: CPT

## 2017-12-20 PROCEDURE — 87040 BLOOD CULTURE FOR BACTERIA: CPT

## 2017-12-20 RX ORDER — MAGNESIUM SULFATE 500 MG/ML
1 VIAL (ML) INJECTION ONCE
Qty: 0 | Refills: 0 | Status: COMPLETED | OUTPATIENT
Start: 2017-12-20 | End: 2017-12-20

## 2017-12-20 RX ADMIN — Medication 1 DROP(S): at 06:31

## 2017-12-20 RX ADMIN — CEFTRIAXONE 100 GRAM(S): 500 INJECTION, POWDER, FOR SOLUTION INTRAMUSCULAR; INTRAVENOUS at 17:22

## 2017-12-20 RX ADMIN — Medication 100 MILLIGRAM(S): at 12:17

## 2017-12-20 RX ADMIN — Medication 100 MILLIGRAM(S): at 06:30

## 2017-12-20 RX ADMIN — Medication 300 MILLIGRAM(S): at 06:30

## 2017-12-20 RX ADMIN — DABIGATRAN ETEXILATE MESYLATE 150 MILLIGRAM(S): 150 CAPSULE ORAL at 17:20

## 2017-12-20 RX ADMIN — DABIGATRAN ETEXILATE MESYLATE 150 MILLIGRAM(S): 150 CAPSULE ORAL at 06:30

## 2017-12-20 RX ADMIN — AZITHROMYCIN 250 MILLIGRAM(S): 500 TABLET, FILM COATED ORAL at 17:22

## 2017-12-20 RX ADMIN — VALSARTAN 320 MILLIGRAM(S): 80 TABLET ORAL at 06:31

## 2017-12-20 RX ADMIN — Medication 100 GRAM(S): at 09:56

## 2017-12-20 RX ADMIN — BRIMONIDINE TARTRATE 1 DROP(S): 2 SOLUTION/ DROPS OPHTHALMIC at 17:21

## 2017-12-20 RX ADMIN — TACROLIMUS 1 MILLIGRAM(S): 5 CAPSULE ORAL at 06:31

## 2017-12-20 RX ADMIN — MYCOPHENOLIC ACID 720 MILLIGRAM(S): 180 TABLET, DELAYED RELEASE ORAL at 17:21

## 2017-12-20 RX ADMIN — BRIMONIDINE TARTRATE 1 DROP(S): 2 SOLUTION/ DROPS OPHTHALMIC at 06:31

## 2017-12-20 RX ADMIN — Medication 100 MILLIGRAM(S): at 17:20

## 2017-12-20 RX ADMIN — MYCOPHENOLIC ACID 720 MILLIGRAM(S): 180 TABLET, DELAYED RELEASE ORAL at 06:55

## 2017-12-20 RX ADMIN — Medication 1000 UNIT(S): at 12:17

## 2017-12-20 RX ADMIN — Medication 50 MILLIGRAM(S): at 06:30

## 2017-12-20 RX ADMIN — TACROLIMUS 1 MILLIGRAM(S): 5 CAPSULE ORAL at 17:19

## 2017-12-20 RX ADMIN — Medication 40 MILLIGRAM(S): at 06:30

## 2017-12-20 RX ADMIN — Medication 1 DROP(S): at 17:21

## 2017-12-20 RX ADMIN — FAMOTIDINE 20 MILLIGRAM(S): 10 INJECTION INTRAVENOUS at 12:17

## 2017-12-20 RX ADMIN — CLOPIDOGREL BISULFATE 75 MILLIGRAM(S): 75 TABLET, FILM COATED ORAL at 12:17

## 2017-12-20 NOTE — PROGRESS NOTE ADULT - ATTENDING COMMENTS
Patient examined, resident's hx and PE reviewed and confirmed. I find pneumonia. Renal transplant. CKD stable. A/P reviewed and confirmed. Continue antibiotics. Ambulate. Check O2. Continue txp meds. See full note.
Patient examined, fellow's hx and PE reviewed and confirmed. I find pneumonia improved. A/P reviewed and confirmed. Continue abx. Follow up as outpatient. See full note.

## 2017-12-20 NOTE — PROGRESS NOTE ADULT - PROBLEM SELECTOR PLAN 1
Patient did not meet sepsis criteria in the ED, but noted a temp of 102 at home. CXR remarkable for a RLL infiltrate and an O2 sat of 92, however that may be his baseline given his extensive smoking history. S/p ceftriaxone and azithromycin in ED  -C/w cef/azithro for CAP coverage  -CT chest
Patient did not meet sepsis criteria in the ED, but noted a temp of 102 at home. CXR remarkable for a RLL infiltrate and an O2 sat of 92, however that may be his baseline given his extensive smoking history. S/p ceftriaxone and azithromycin in ED  -C/w cef/azithro for CAP coverage  -CT chest showing infiltrate RUL RML RLL  - Pt currently stable possible d/c today with PO abx

## 2017-12-20 NOTE — DISCHARGE NOTE ADULT - CARE PLAN
Principal Discharge DX:	Community acquired pneumonia  Goal:	Medication  Instructions for follow-up, activity and diet:	You were admitted to this hospital with Pneumonia and were given IV antibiotics. Please continue taking......for ....days.   - Please followup with Dr. Jason in 1 week.   - If you develop fevers, chills, worsening cough, shortness of breath please contact Dr. Jason or go to nearest ER.  Secondary Diagnosis:	Oropharynx erythematous  Goal:	Followup  Instructions for follow-up, activity and diet:	You were noted to have redness in your throat which was evaluated by ENT team. Please followup with Dr. Lona Cook in 1 week to confirm that this lesion is resolving.  - If your throat pain worsens or if you have difficulty breathing or swelling, please reach out to Dr. Jason or go to the nearest ER  Secondary Diagnosis:	S/P primary angioplasty with coronary stent  Instructions for follow-up, activity and diet:	Continue home Plavix and Pradaxa  Secondary Diagnosis:	Type 2 diabetes mellitus  Instructions for follow-up, activity and diet:	COntinue home medications  Secondary Diagnosis:	History of kidney transplant  Instructions for follow-up, activity and diet:	Continue home Tacrilomus and Mycophenolic acid  Secondary Diagnosis:	Atrial fibrillation  Instructions for follow-up, activity and diet:	Continue home medications Principal Discharge DX:	Community acquired pneumonia  Goal:	Medication  Instructions for follow-up, activity and diet:	You were admitted to this hospital with Pneumonia and were given IV antibiotics. Please continue taking Augmentin twice daily for 5 more days and Azithromycin once daily for 3 more days starting tomorrow    - Please followup with Dr. Jason in 1 week.   - If you develop fevers, chills, worsening cough, shortness of breath please contact Dr. Jason or go to nearest ER.  Secondary Diagnosis:	Oropharynx erythematous  Goal:	Followup  Instructions for follow-up, activity and diet:	You were noted to have redness in your throat which was evaluated by ENT team. Please followup with Dr. Lona Cook in 1 week to confirm that this lesion is resolving.  - If your throat pain worsens or if you have difficulty breathing or swelling, please reach out to Dr. Jason or go to the nearest ER  Secondary Diagnosis:	S/P primary angioplasty with coronary stent  Instructions for follow-up, activity and diet:	Continue home Plavix and Pradaxa  Secondary Diagnosis:	Type 2 diabetes mellitus  Instructions for follow-up, activity and diet:	COntinue home medications  Secondary Diagnosis:	History of kidney transplant  Instructions for follow-up, activity and diet:	Continue home Tacrilomus and Mycophenolic acid  Secondary Diagnosis:	Atrial fibrillation  Instructions for follow-up, activity and diet:	Continue home medications Principal Discharge DX:	Community acquired pneumonia  Goal:	Medication  Instructions for follow-up, activity and diet:	You were admitted to this hospital with Pneumonia and were given IV antibiotics. Please continue taking  Azithromycin once daily for 3 more days starting tomorrow    - Please followup with Dr. Jason in 1 week.   - If you develop fevers, chills, worsening cough, shortness of breath please contact Dr. Jason or go to nearest ER.  Secondary Diagnosis:	Oropharynx erythematous  Goal:	Followup  Instructions for follow-up, activity and diet:	You were noted to have redness in your throat which was evaluated by ENT team. Please followup with Dr. Lona Cook in 1 week to confirm that this lesion is resolving.  - If your throat pain worsens or if you have difficulty breathing or swelling, please reach out to Dr. Jason or go to the nearest ER  Secondary Diagnosis:	S/P primary angioplasty with coronary stent  Instructions for follow-up, activity and diet:	Continue home Plavix and Pradaxa  Secondary Diagnosis:	Type 2 diabetes mellitus  Instructions for follow-up, activity and diet:	Continue home medications  Secondary Diagnosis:	History of kidney transplant  Instructions for follow-up, activity and diet:	Continue home Tacrolimus and Mycophenolic acid  Secondary Diagnosis:	Atrial fibrillation  Instructions for follow-up, activity and diet:	Continue home medications

## 2017-12-20 NOTE — DISCHARGE NOTE ADULT - SECONDARY DIAGNOSIS.
Oropharynx erythematous S/P primary angioplasty with coronary stent Type 2 diabetes mellitus History of kidney transplant Atrial fibrillation

## 2017-12-20 NOTE — PROGRESS NOTE ADULT - PROBLEM SELECTOR PLAN 8
Patient unclear of etiology of kidney failure. Was on dialysis but now urinating with normal BUN/Cr  -C/w prograf and cellcept
Patient unclear of etiology of kidney failure. Was on dialysis but now urinating with normal BUN/Cr  -C/w prograf  -Patient is unsure if he is still taking cellcept, clarify in AM

## 2017-12-20 NOTE — DISCHARGE NOTE ADULT - PATIENT PORTAL LINK FT
“You can access the [facility] Patient Portal, offered by [facility], by registering with the following website: [email or web address]”

## 2017-12-20 NOTE — DISCHARGE NOTE ADULT - PLAN OF CARE
Medication You were admitted to this hospital with Pneumonia and were given IV antibiotics. Please continue taking......for ....days.   - Please followup with Dr. Jason in 1 week.   - If you develop fevers, chills, worsening cough, shortness of breath please contact Dr. Jason or go to nearest ER. Followup You were noted to have redness in your throat which was evaluated by ENT team. Please followup with Dr. Lona Cook in 1 week to confirm that this lesion is resolving.  - If your throat pain worsens or if you have difficulty breathing or swelling, please reach out to Dr. Jason or go to the nearest ER Continue home Plavix and Pradaxa COntinue home medications Continue home Tacrilomus and Mycophenolic acid Continue home medications You were admitted to this hospital with Pneumonia and were given IV antibiotics. Please continue taking Augmentin twice daily for 5 more days and Azithromycin once daily for 3 more days starting tomorrow    - Please followup with Dr. Jason in 1 week.   - If you develop fevers, chills, worsening cough, shortness of breath please contact Dr. Jason or go to nearest ER. You were admitted to this hospital with Pneumonia and were given IV antibiotics. Please continue taking  Azithromycin once daily for 3 more days starting tomorrow    - Please followup with Dr. Jason in 1 week.   - If you develop fevers, chills, worsening cough, shortness of breath please contact Dr. Jason or go to nearest ER. Continue home Tacrolimus and Mycophenolic acid

## 2017-12-20 NOTE — DISCHARGE NOTE ADULT - MEDICATION SUMMARY - MEDICATIONS TO TAKE
I will START or STAY ON the medications listed below when I get home from the hospital:    valsartan 320 mg oral tablet  -- 1 tab(s) by mouth once a day  -- Indication: For Hypertension    diltiazem 300 mg/24 hours oral tablet, extended release  -- 1 tab(s) by mouth once a day  -- Indication: For Atrial fibrillation    Pradaxa 150 mg oral capsule  -- 1 cap(s) by mouth 2 times a day  -- Indication: For Atrial fibrillation    Prandin 2 mg oral tablet  -- 1 tab(s) by mouth once a day (at bedtime)  -- Indication: For diabetes    Tradjenta 5 mg oral tablet  -- 1 tab(s) by mouth once a day  -- Indication: For Diabetes    metFORMIN 1000 mg oral tablet  -- 1 tab(s) by mouth 2 times a day    RESTART ON SATURDAY JAN. 28, 2017.  -- Indication: For Diabetes    Lipitor 10 mg oral tablet  -- 1 tab(s) by mouth once a day (at bedtime)  -- Indication: For Hyperlipidemia    clopidogrel 75 mg oral tablet  -- 1 tab(s) by mouth once a day.  -- Indication: For Coronary artery disease    Toprol-XL 50 mg oral tablet, extended release  -- 1 tab(s) by mouth once a day  -- Indication: For Coronary artery disease    bimatoprost topical ophthalmic  -- Apply on skin to affected area once a day (at bedtime)  -- Indication: For Skin lesion    furosemide 40 mg oral tablet  -- 1 tab(s) by mouth once a day  -- Indication: For Hypertension    raNITIdine 150 mg oral capsule  -- 1 cap(s) by mouth once a day  -- Indication: For GERD    famotidine 20 mg oral tablet  -- 1 tab(s) by mouth once a day  -- Indication: For GERD    tacrolimus 1 mg oral capsule  -- 1 cap(s) by mouth once a day (at bedtime)  -- Indication: For Kidney transplanted    mycophenolic acid 360 mg oral delayed release tablet  -- 2 tab(s) by mouth every 12 hours  -- Indication: For Kidney transplanted    docusate sodium 100 mg oral capsule  -- 1 cap(s) by mouth 2 times a day  -- Indication: For Stool softner    azithromycin 250 mg oral tablet  -- 1 tab(s) by mouth every 24 hours  -- Indication: For Community acquired pneumonia    potassium chloride 20 mEq oral tablet, extended release  -- 1 tab(s) by mouth once a day  -- Indication: For Hypokalemia    magnesium oxide 400 mg (241.3 mg elemental magnesium) oral tablet  -- 1 tab(s) by mouth once a day  -- Indication: For Prophylactic measure    brimonidine-timolol 0.2%-0.5% ophthalmic solution  -- 1 drop(s) to each affected eye every 12 hours  -- Indication: For Prophylactic measure    amoxicillin-clavulanate 875 mg-125 mg oral tablet  -- 1 tab(s) by mouth 2 times a day   -- Finish all this medication unless otherwise directed by prescriber.  Take with food or milk.    -- Indication: For Community acquired pneumonia    thiamine 100 mg oral tablet  -- 1 tab(s) by mouth once a day  -- Indication: For Prophylactic measure    Vitamin D3 1000 intl units oral capsule  -- 1 cap(s) by mouth once a day  -- Indication: For Prophylactic measure I will START or STAY ON the medications listed below when I get home from the hospital:    valsartan 320 mg oral tablet  -- 1 tab(s) by mouth once a day  -- Indication: For Hypertension    diltiazem 300 mg/24 hours oral tablet, extended release  -- 1 tab(s) by mouth once a day  -- Indication: For Atrial fibrillation    Pradaxa 150 mg oral capsule  -- 1 cap(s) by mouth 2 times a day  -- Indication: For Atrial fibrillation    Prandin 2 mg oral tablet  -- 1 tab(s) by mouth once a day (at bedtime)  -- Indication: For diabetes    Tradjenta 5 mg oral tablet  -- 1 tab(s) by mouth once a day  -- Indication: For Diabetes    metFORMIN 1000 mg oral tablet  -- 1 tab(s) by mouth 2 times a day    RESTART ON SATURDAY JAN. 28, 2017.  -- Indication: For Diabetes    Lipitor 10 mg oral tablet  -- 1 tab(s) by mouth once a day (at bedtime)  -- Indication: For Hyperlipidemia    clopidogrel 75 mg oral tablet  -- 1 tab(s) by mouth once a day.  -- Indication: For Coronary artery disease    Toprol-XL 50 mg oral tablet, extended release  -- 1 tab(s) by mouth once a day  -- Indication: For Coronary artery disease    bimatoprost topical ophthalmic  -- Apply on skin to affected area once a day (at bedtime)  -- Indication: For Skin lesion    furosemide 40 mg oral tablet  -- 1 tab(s) by mouth once a day  -- Indication: For Hypertension    raNITIdine 150 mg oral capsule  -- 1 cap(s) by mouth once a day  -- Indication: For GERD    famotidine 20 mg oral tablet  -- 1 tab(s) by mouth once a day  -- Indication: For GERD    tacrolimus 1 mg oral capsule  -- 1 cap(s) by mouth once a day (at bedtime)  -- Indication: For Kidney transplanted    mycophenolic acid 360 mg oral delayed release tablet  -- 2 tab(s) by mouth every 12 hours  -- Indication: For Kidney transplanted    docusate sodium 100 mg oral capsule  -- 1 cap(s) by mouth 2 times a day  -- Indication: For Stool softner    azithromycin 250 mg oral tablet  -- 1 tab(s) by mouth every 24 hours  -- Indication: For Community acquired pneumonia    potassium chloride 20 mEq oral tablet, extended release  -- 1 tab(s) by mouth once a day  -- Indication: For Hypokalemia    magnesium oxide 400 mg (241.3 mg elemental magnesium) oral tablet  -- 1 tab(s) by mouth once a day  -- Indication: For Prophylactic measure    brimonidine-timolol 0.2%-0.5% ophthalmic solution  -- 1 drop(s) to each affected eye every 12 hours  -- Indication: For Prophylactic measure    thiamine 100 mg oral tablet  -- 1 tab(s) by mouth once a day  -- Indication: For Prophylactic measure    Vitamin D3 1000 intl units oral capsule  -- 1 cap(s) by mouth once a day  -- Indication: For Prophylactic measure

## 2017-12-20 NOTE — PROGRESS NOTE ADULT - ASSESSMENT
Patient is a 72 year old male, poor historian, with a PMHx of kidney transplant 2011, dCHF, AS, Afib on pradaxa, CAD s/p WALLY to dLCx/pLCx (1/2017), HTN, HLD, BPH, DM and active smoker/drinker who presents with CAP Patient is a 72 year old male, poor historian, with a PMHx of kidney transplant 2011, AS, Afib on pradaxa, CAD s/p WALLY to dLCx/pLCx (1/2017), HTN, HLD, BPH, DM and active smoker/drinker who presents with CAP

## 2017-12-20 NOTE — PROGRESS NOTE ADULT - PROBLEM SELECTOR PLAN 6
Holding home prandin, tradjenta and metformin  -C/w ISS, FSq4, diabetic diet  -A1c in AM
Holding home prandin, tradjenta and metformin  -C/w ISS, FSq4, diabetic diet  -A1c 6.2

## 2017-12-20 NOTE — PROGRESS NOTE ADULT - PROBLEM SELECTOR PLAN 4
Does not appear to be in exacerbation. Cough likely 2/2 PNA, so no need for echo at this time  -C/w lasix, toprol, valsartan  -Patient is insure if he still needs to take potassium supplementation
S/p WALLY to distal and proximal LCX. Known to Dr Becker  -C/w lipitor, toprol, valsartan  - c/w Pradaxa and Plavix   -F/u lipid panel (patient only on 10mg lipitor)

## 2017-12-20 NOTE — DISCHARGE NOTE ADULT - HOSPITAL COURSE
michael is a 72 year old male, poor historian, with a PMHx of kidney transplant 2011, AS, Afib on pradaxa, CAD s/p WALLY to dLCx/pLCx (1/2017) on plavix, HTN, HLD, BPH, DM and active smoker/drinker who presents with CAP. Given IV cefxtriaxone and Azithromycin in the hospital and to be discharged on ...................CT chest showed infiltrate of RUL, RML and RLL. Seen by ENT for oropharynx hematoma-to f/u opt in 1 week michael is a 72 year old male, poor historian, with a PMHx of kidney transplant 2011, AS, Afib on pradaxa, CAD s/p WALLY to dLCx/pLCx (1/2017) on plavix, HTN, HLD, BPH, DM and active smoker/drinker who presents with CAP. Given IV cefxtriaxone and Azithromycin in the hospital and to be discharged on Augmentin and Azithromycin. CT chest showed infiltrate of RUL, RML and RLL. Seen by ENT for oropharynx hematoma-to f/u opt in 1 week michael is a 72 year old male, poor historian, with a PMHx of kidney transplant 2011, AS, Afib on pradaxa, CAD s/p WALLY to dLCx/pLCx (1/2017) on plavix, HTN, HLD, BPH, DM and active smoker/drinker who presents with CAP. Given IV cefxtriaxone and Azithromycin in the hospital and to be discharged on Azithromycin to complete a 5 day course. CT chest showed infiltrate of RUL, RML and RLL. Seen by ENT for oropharynx hematoma-to f/u opt in 1 week

## 2017-12-20 NOTE — DISCHARGE NOTE ADULT - CARE PROVIDERS DIRECT ADDRESSES
[email or web address]

## 2017-12-20 NOTE — PROGRESS NOTE ADULT - PROBLEM SELECTOR PLAN 3
S/p WALLY to distal and proximal LCX. Known to Dr Becker  -C/w ASA, lipitor, toprol, valsartan  -F/u lipid panel (patient only on 10mg lipitor)  -Clarify if this is the correct regimen as dc note after PCI said to take ASA for one month and take plavix/pradaxa for 3 months.
Currently rate controlled  -C/w toprol and pradaxa

## 2017-12-20 NOTE — CONSULT NOTE ADULT - SUBJECTIVE AND OBJECTIVE BOX
ENT Consult Note    HPI: 72M with CAD on pradaxa/plavix admitted for PNA with ENT consulted for concern for oral hematoma. Patient 12/18 for PNA with symptoms currently much improved. Denies SOB, CP. Reports tolerating regular diet. Voice wnl. States he has had some mild odynophagia for the past 2-3 weeks. Denies any recent surgery, intubation or oral trauma. Afebrile. WBC 7.6. Oral exam by primary team with concern for hematoma with ENT consulted for evaluation.     PE:  NAD  Breathing comfortably on RA  Voice strong  No trismus  Isolated uvula hematoma appreciated, no extension onto surrounding soft palate. No additional oral/oropharyngeal hematoma.  Oropharynx easily visualized  Neck soft    FFL:  Nasopharynx clear. Uvula hematoma appreciated, without extension onto surrounding tissues. Vallecula/pyriforms clear. Epiglottis/AE folds/arytenoids wnl. TVF mobile and symmetric. Airway patent.    A/P: 72M on pradaxa/plavix with isolated uvula hematoma 2/2 unknown trauma. Currently asymptomatic.  - No acute ENT intervention  - Patient should follow up with ENT in approximately one week for repeat scope exam to confirm stability of hematoma. Can contact office of Dr. Lona Cook/Dr. Iain Ruth.  - Contact ENT with any questions or concerns

## 2017-12-20 NOTE — PROGRESS NOTE ADULT - SUBJECTIVE AND OBJECTIVE BOX
OVERNIGHT EVENTS:    SUBJECTIVE / INTERVAL HPI: Patient seen and examined at bedside. Complains of sore throat which has been going on and off for 1 month. Denies fevers, chills, dysphagia, SOB, chest pain, cough, n/v/abd pain     VITAL SIGNS:  Vital Signs Last 24 Hrs  T(C): 36.6 (20 Dec 2017 10:06), Max: 37.2 (19 Dec 2017 16:38)  T(F): 97.8 (20 Dec 2017 10:06), Max: 98.9 (19 Dec 2017 16:38)  HR: 60 (20 Dec 2017 10:06) (60 - 81)  BP: 131/76 (20 Dec 2017 10:06) (129/81 - 152/71)  BP(mean): --  RR: 18 (20 Dec 2017 10:06) (18 - 18)  SpO2: 93% (20 Dec 2017 10:06) (93% - 95%)    PHYSICAL EXAM:    General: WDWN  HEENT: Hyperpigmented lesion on oropharynx with no exudates, no active bleeding. Possible bruising or hematoma   Neck: supple  Cardiovascular: +S1/S2; RRR III/VI holosystolic murmur.   Respiratory: bibasilar crackles   Gastrointestinal: soft, NT/ND; +BSx4  Extremities: chronic skin changes b/l   Vascular: 1+ radial, DP/PT pulses B/L  Neurological: AAOx3; no focal deficits    MEDICATIONS:  MEDICATIONS  (STANDING):  atorvastatin 10 milliGRAM(s) Oral at bedtime  azithromycin   Tablet 250 milliGRAM(s) Oral every 24 hours  brimonidine 0.2% Ophthalmic Solution 1 Drop(s) Both EYES two times a day  cefTRIAXone   IVPB 1 Gram(s) IV Intermittent every 24 hours  cholecalciferol 1000 Unit(s) Oral daily  clopidogrel Tablet 75 milliGRAM(s) Oral daily  dabigatran 150 milliGRAM(s) Oral every 12 hours  dextrose 5%. 1000 milliLiter(s) (50 mL/Hr) IV Continuous <Continuous>  dextrose 50% Injectable 12.5 Gram(s) IV Push once  dextrose 50% Injectable 25 Gram(s) IV Push once  dextrose 50% Injectable 25 Gram(s) IV Push once  diltiazem    milliGRAM(s) Oral daily  docusate sodium 100 milliGRAM(s) Oral two times a day  famotidine    Tablet 20 milliGRAM(s) Oral daily  furosemide    Tablet 40 milliGRAM(s) Oral daily  insulin lispro (HumaLOG) corrective regimen sliding scale   SubCutaneous Before meals and at bedtime  latanoprost 0.005% Ophthalmic Solution 1 Drop(s) Both EYES at bedtime  metoprolol succinate ER 50 milliGRAM(s) Oral daily  mycophenolic Acid 720 milliGRAM(s) Oral two times a day  tacrolimus 1 milliGRAM(s) Oral two times a day  thiamine 100 milliGRAM(s) Oral daily  timolol 0.5% Solution 1 Drop(s) Both EYES two times a day  valsartan 320 milliGRAM(s) Oral daily    MEDICATIONS  (PRN):  dextrose Gel 1 Dose(s) Oral once PRN Blood Glucose LESS THAN 70 milliGRAM(s)/deciliter  glucagon  Injectable 1 milliGRAM(s) IntraMuscular once PRN Glucose LESS THAN 70 milligrams/deciliter      ALLERGIES:  Allergies    No Known Allergies    Intolerances        LABS:                        12.6   7.6   )-----------( 198      ( 20 Dec 2017 06:46 )             39.2     12-    139  |  99  |  14  ----------------------------<  159<H>  4.2   |  27  |  0.89    Ca    9.5      20 Dec 2017 06:46  Mg     1.9     -    TPro  8.0  /  Alb  4.7  /  TBili  1.0  /  DBili  x   /  AST  31  /  ALT  33  /  AlkPhos  49        Urinalysis Basic - ( 18 Dec 2017 17:23 )    Color: Yellow / Appearance: Clear / S.010 / pH: x  Gluc: x / Ketone: NEGATIVE  / Bili: Negative / Urobili: 0.2 E.U./dL   Blood: x / Protein: NEGATIVE mg/dL / Nitrite: NEGATIVE   Leuk Esterase: NEGATIVE / RBC: < 5 /HPF / WBC 5-10 /HPF   Sq Epi: x / Non Sq Epi: Moderate /HPF / Bacteria: Present /HPF      CAPILLARY BLOOD GLUCOSE      POCT Blood Glucose.: 115 mg/dL (18 Dec 2017 23:09)      RADIOLOGY & ADDITIONAL TESTS: Reviewed.
OVERNIGHT EVENTS: REGINO    SUBJECTIVE / INTERVAL HPI: Patient seen and examined at bedside. NAD. Continues to have cough. Denies fevers, chills, SOB, chest pain, n/v/abd pain    VITAL SIGNS:  Vital Signs Last 24 Hrs  T(C): 36.6 (19 Dec 2017 05:18), Max: 37 (18 Dec 2017 16:50)  T(F): 97.8 (19 Dec 2017 05:18), Max: 98.6 (18 Dec 2017 16:50)  HR: 79 (19 Dec 2017 05:18) (75 - 91)  BP: 130/81 (19 Dec 2017 05:18) (130/81 - 162/94)  BP(mean): --  RR: 18 (19 Dec 2017 05:18) (16 - 18)  SpO2: 94% (19 Dec 2017 05:18) (93% - 96%)    PHYSICAL EXAM:    General: NAD   HEENT: NC/AT; PERRL, anicteric sclera; MMM  Neck: supple  Cardiovascular: +S1/S2; RRR III/VI holosystolic murmur.   Respiratory: RLL crackles   Gastrointestinal: soft, NT/ND; +BSx4  Extremities: chronic skin changes b/l   Vascular: 1+ radial, DP/PT pulses B/L  Neurological: AAOx3; no focal deficits    MEDICATIONS:  MEDICATIONS  (STANDING):  aspirin  chewable 81 milliGRAM(s) Oral daily  atorvastatin 10 milliGRAM(s) Oral at bedtime  azithromycin   Tablet 250 milliGRAM(s) Oral daily  brimonidine 0.2% Ophthalmic Solution 1 Drop(s) Both EYES two times a day  cefTRIAXone   IVPB 1 Gram(s) IV Intermittent every 24 hours  cholecalciferol 1000 Unit(s) Oral daily  dabigatran 150 milliGRAM(s) Oral every 12 hours  dextrose 5%. 1000 milliLiter(s) (50 mL/Hr) IV Continuous <Continuous>  dextrose 50% Injectable 12.5 Gram(s) IV Push once  dextrose 50% Injectable 25 Gram(s) IV Push once  dextrose 50% Injectable 25 Gram(s) IV Push once  diltiazem    milliGRAM(s) Oral daily  docusate sodium 100 milliGRAM(s) Oral two times a day  famotidine    Tablet 20 milliGRAM(s) Oral daily  furosemide    Tablet 40 milliGRAM(s) Oral daily  heparin  Injectable 5000 Unit(s) SubCutaneous every 8 hours  insulin lispro (HumaLOG) corrective regimen sliding scale   SubCutaneous Before meals and at bedtime  latanoprost 0.005% Ophthalmic Solution 1 Drop(s) Both EYES at bedtime  metoprolol succinate ER 50 milliGRAM(s) Oral daily  tacrolimus 1 milliGRAM(s) Oral two times a day  thiamine 100 milliGRAM(s) Oral daily  timolol 0.5% Solution 1 Drop(s) Both EYES two times a day  valsartan 320 milliGRAM(s) Oral daily    MEDICATIONS  (PRN):  dextrose Gel 1 Dose(s) Oral once PRN Blood Glucose LESS THAN 70 milliGRAM(s)/deciliter  glucagon  Injectable 1 milliGRAM(s) IntraMuscular once PRN Glucose LESS THAN 70 milligrams/deciliter      ALLERGIES:  Allergies    No Known Allergies    Intolerances        LABS:                        13.6   11.5  )-----------( 191      ( 18 Dec 2017 17:33 )             41.2     12-18    140  |  99  |  13  ----------------------------<  144<H>  4.9   |  24  |  0.93    Ca    9.7      18 Dec 2017 17:33    TPro  8.0  /  Alb  4.7  /  TBili  1.0  /  DBili  x   /  AST  31  /  ALT  33  /  AlkPhos  49  12-18      Urinalysis Basic - ( 18 Dec 2017 17:23 )    Color: Yellow / Appearance: Clear / S.010 / pH: x  Gluc: x / Ketone: NEGATIVE  / Bili: Negative / Urobili: 0.2 E.U./dL   Blood: x / Protein: NEGATIVE mg/dL / Nitrite: NEGATIVE   Leuk Esterase: NEGATIVE / RBC: < 5 /HPF / WBC 5-10 /HPF   Sq Epi: x / Non Sq Epi: Moderate /HPF / Bacteria: Present /HPF      CAPILLARY BLOOD GLUCOSE      POCT Blood Glucose.: 115 mg/dL (18 Dec 2017 23:09)      RADIOLOGY & ADDITIONAL TESTS: Reviewed.    ASSESSMENT:    PLAN:

## 2017-12-20 NOTE — DISCHARGE NOTE ADULT - CARE PROVIDER_API CALL
Reza Jason), Internal Medicine; Nephrology  [address]  [address]  [place] [address]  Phone: [phone]  Fax: [phone]    Lona Cook), Otolaryngology  [address]  [address] [place] [address]  Phone: [phone]  Fax: [phone]

## 2017-12-20 NOTE — PROGRESS NOTE ADULT - PROBLEM SELECTOR PLAN 2
Currently rate controlled  -C/w toprol and pradaxa
- Sore throat 2-3 week, hyperpigmented lesion in oropharynx. Seen by ENT- Pt can continue Pradaxa and followup opt ewith ENT in 1 week

## 2017-12-21 LAB — LEGIONELLA AG UR QL: NEGATIVE — SIGNIFICANT CHANGE UP

## 2017-12-21 RX ORDER — AZITHROMYCIN 500 MG/1
1 TABLET, FILM COATED ORAL
Qty: 3 | Refills: 0 | OUTPATIENT
Start: 2017-12-21 | End: 2017-12-23

## 2017-12-23 LAB
CULTURE RESULTS: SIGNIFICANT CHANGE UP
CULTURE RESULTS: SIGNIFICANT CHANGE UP
SPECIMEN SOURCE: SIGNIFICANT CHANGE UP
SPECIMEN SOURCE: SIGNIFICANT CHANGE UP

## 2018-01-01 ENCOUNTER — RX RENEWAL (OUTPATIENT)
Age: 73
End: 2018-01-01

## 2018-01-02 ENCOUNTER — RX RENEWAL (OUTPATIENT)
Age: 73
End: 2018-01-02

## 2018-01-07 ENCOUNTER — MOBILE ON CALL (OUTPATIENT)
Age: 73
End: 2018-01-07

## 2018-01-08 ENCOUNTER — RX RENEWAL (OUTPATIENT)
Age: 73
End: 2018-01-08

## 2018-01-26 ENCOUNTER — INPATIENT (INPATIENT)
Facility: HOSPITAL | Age: 73
LOS: 3 days | Discharge: HOME | DRG: 698 | End: 2018-01-30
Attending: INTERNAL MEDICINE | Admitting: INTERNAL MEDICINE
Payer: MEDICARE

## 2018-01-26 VITALS
HEART RATE: 107 BPM | DIASTOLIC BLOOD PRESSURE: 71 MMHG | WEIGHT: 190.04 LBS | SYSTOLIC BLOOD PRESSURE: 109 MMHG | RESPIRATION RATE: 17 BRPM | OXYGEN SATURATION: 95 % | HEIGHT: 68 IN | TEMPERATURE: 98 F

## 2018-01-26 DIAGNOSIS — E78.5 HYPERLIPIDEMIA, UNSPECIFIED: ICD-10-CM

## 2018-01-26 DIAGNOSIS — E11.9 TYPE 2 DIABETES MELLITUS WITHOUT COMPLICATIONS: ICD-10-CM

## 2018-01-26 DIAGNOSIS — Z29.9 ENCOUNTER FOR PROPHYLACTIC MEASURES, UNSPECIFIED: ICD-10-CM

## 2018-01-26 DIAGNOSIS — R63.8 OTHER SYMPTOMS AND SIGNS CONCERNING FOOD AND FLUID INTAKE: ICD-10-CM

## 2018-01-26 DIAGNOSIS — Z95.5 PRESENCE OF CORONARY ANGIOPLASTY IMPLANT AND GRAFT: Chronic | ICD-10-CM

## 2018-01-26 DIAGNOSIS — I50.32 CHRONIC DIASTOLIC (CONGESTIVE) HEART FAILURE: ICD-10-CM

## 2018-01-26 DIAGNOSIS — E87.1 HYPO-OSMOLALITY AND HYPONATREMIA: ICD-10-CM

## 2018-01-26 DIAGNOSIS — I48.2 CHRONIC ATRIAL FIBRILLATION: ICD-10-CM

## 2018-01-26 DIAGNOSIS — A41.9 SEPSIS, UNSPECIFIED ORGANISM: ICD-10-CM

## 2018-01-26 DIAGNOSIS — I10 ESSENTIAL (PRIMARY) HYPERTENSION: ICD-10-CM

## 2018-01-26 DIAGNOSIS — T86.13 KIDNEY TRANSPLANT INFECTION: ICD-10-CM

## 2018-01-26 DIAGNOSIS — I77.0 ARTERIOVENOUS FISTULA, ACQUIRED: Chronic | ICD-10-CM

## 2018-01-26 LAB
ALBUMIN SERPL ELPH-MCNC: 4.5 G/DL — SIGNIFICANT CHANGE UP (ref 3.3–5)
ALP SERPL-CCNC: 53 U/L — SIGNIFICANT CHANGE UP (ref 40–120)
ALT FLD-CCNC: 21 U/L — SIGNIFICANT CHANGE UP (ref 10–45)
ANION GAP SERPL CALC-SCNC: 15 MMOL/L — SIGNIFICANT CHANGE UP (ref 5–17)
ANION GAP SERPL CALC-SCNC: 20 MMOL/L — HIGH (ref 5–17)
APPEARANCE UR: CLEAR — SIGNIFICANT CHANGE UP
APTT BLD: 58.6 SEC — HIGH (ref 27.5–37.4)
AST SERPL-CCNC: 18 U/L — SIGNIFICANT CHANGE UP (ref 10–40)
BILIRUB SERPL-MCNC: 1.2 MG/DL — SIGNIFICANT CHANGE UP (ref 0.2–1.2)
BILIRUB UR-MCNC: NEGATIVE — SIGNIFICANT CHANGE UP
BUN SERPL-MCNC: 26 MG/DL — HIGH (ref 7–23)
BUN SERPL-MCNC: 28 MG/DL — HIGH (ref 7–23)
CALCIUM SERPL-MCNC: 10 MG/DL — SIGNIFICANT CHANGE UP (ref 8.4–10.5)
CALCIUM SERPL-MCNC: 8.2 MG/DL — LOW (ref 8.4–10.5)
CHLORIDE SERPL-SCNC: 86 MMOL/L — LOW (ref 96–108)
CHLORIDE SERPL-SCNC: 91 MMOL/L — LOW (ref 96–108)
CO2 SERPL-SCNC: 20 MMOL/L — LOW (ref 22–31)
CO2 SERPL-SCNC: 22 MMOL/L — SIGNIFICANT CHANGE UP (ref 22–31)
COLOR SPEC: YELLOW — SIGNIFICANT CHANGE UP
CREAT SERPL-MCNC: 1.49 MG/DL — HIGH (ref 0.5–1.3)
CREAT SERPL-MCNC: 1.59 MG/DL — HIGH (ref 0.5–1.3)
DIFF PNL FLD: (no result)
GLUCOSE SERPL-MCNC: 112 MG/DL — HIGH (ref 70–99)
GLUCOSE SERPL-MCNC: 122 MG/DL — HIGH (ref 70–99)
GLUCOSE UR QL: NEGATIVE — SIGNIFICANT CHANGE UP
HCT VFR BLD CALC: 39.2 % — SIGNIFICANT CHANGE UP (ref 39–50)
HGB BLD-MCNC: 13.5 G/DL — SIGNIFICANT CHANGE UP (ref 13–17)
HIV 1+2 AB+HIV1 P24 AG SERPL QL IA: SIGNIFICANT CHANGE UP
INR BLD: 1.63 — HIGH (ref 0.88–1.16)
KETONES UR-MCNC: NEGATIVE — SIGNIFICANT CHANGE UP
LACTATE SERPL-SCNC: 2 MMOL/L — SIGNIFICANT CHANGE UP (ref 0.5–2)
LEUKOCYTE ESTERASE UR-ACNC: (no result)
LYMPHOCYTES # BLD AUTO: 1 % — LOW (ref 13–44)
MCHC RBC-ENTMCNC: 31.5 PG — SIGNIFICANT CHANGE UP (ref 27–34)
MCHC RBC-ENTMCNC: 34.4 G/DL — SIGNIFICANT CHANGE UP (ref 32–36)
MCV RBC AUTO: 91.4 FL — SIGNIFICANT CHANGE UP (ref 80–100)
MONOCYTES NFR BLD AUTO: 7 % — SIGNIFICANT CHANGE UP (ref 2–14)
NEUTROPHILS NFR BLD AUTO: 75 % — SIGNIFICANT CHANGE UP (ref 43–77)
NITRITE UR-MCNC: NEGATIVE — SIGNIFICANT CHANGE UP
OSMOLALITY UR: 197 MOSMOL/KG — SIGNIFICANT CHANGE UP (ref 100–650)
PH UR: 5.5 — SIGNIFICANT CHANGE UP (ref 5–8)
PHOSPHATE 24H UR-MCNC: 3.7 MG/DL — SIGNIFICANT CHANGE UP
PLATELET # BLD AUTO: 126 K/UL — LOW (ref 150–400)
POTASSIUM SERPL-MCNC: 3.6 MMOL/L — SIGNIFICANT CHANGE UP (ref 3.5–5.3)
POTASSIUM SERPL-MCNC: 3.9 MMOL/L — SIGNIFICANT CHANGE UP (ref 3.5–5.3)
POTASSIUM SERPL-SCNC: 3.6 MMOL/L — SIGNIFICANT CHANGE UP (ref 3.5–5.3)
POTASSIUM SERPL-SCNC: 3.9 MMOL/L — SIGNIFICANT CHANGE UP (ref 3.5–5.3)
PROT SERPL-MCNC: 8.4 G/DL — HIGH (ref 6–8.3)
PROT UR-MCNC: 30 MG/DL
PROTHROM AB SERPL-ACNC: 18.3 SEC — HIGH (ref 9.8–12.7)
RAPID RVP RESULT: SIGNIFICANT CHANGE UP
RBC # BLD: 4.29 M/UL — SIGNIFICANT CHANGE UP (ref 4.2–5.8)
RBC # FLD: 15.1 % — SIGNIFICANT CHANGE UP (ref 10.3–16.9)
SODIUM SERPL-SCNC: 126 MMOL/L — LOW (ref 135–145)
SODIUM SERPL-SCNC: 128 MMOL/L — LOW (ref 135–145)
SP GR SPEC: 1.01 — SIGNIFICANT CHANGE UP (ref 1–1.03)
TROPONIN T SERPL-MCNC: 0.02 NG/ML — HIGH (ref 0–0.01)
UROBILINOGEN FLD QL: 0.2 E.U./DL — SIGNIFICANT CHANGE UP
UUN UR-MCNC: 205 MG/DL — SIGNIFICANT CHANGE UP
WBC # BLD: 14.5 K/UL — HIGH (ref 3.8–10.5)
WBC # FLD AUTO: 14.5 K/UL — HIGH (ref 3.8–10.5)

## 2018-01-26 PROCEDURE — 78707 K FLOW/FUNCT IMAGE W/O DRUG: CPT | Mod: 26

## 2018-01-26 PROCEDURE — 71046 X-RAY EXAM CHEST 2 VIEWS: CPT | Mod: 26

## 2018-01-26 PROCEDURE — 71250 CT THORAX DX C-: CPT | Mod: 26

## 2018-01-26 PROCEDURE — 99285 EMERGENCY DEPT VISIT HI MDM: CPT

## 2018-01-26 PROCEDURE — 74176 CT ABD & PELVIS W/O CONTRAST: CPT | Mod: 26

## 2018-01-26 PROCEDURE — 76770 US EXAM ABDO BACK WALL COMP: CPT | Mod: 26

## 2018-01-26 PROCEDURE — 99223 1ST HOSP IP/OBS HIGH 75: CPT

## 2018-01-26 RX ORDER — VALSARTAN 80 MG/1
320 TABLET ORAL DAILY
Qty: 0 | Refills: 0 | Status: DISCONTINUED | OUTPATIENT
Start: 2018-01-26 | End: 2018-01-30

## 2018-01-26 RX ORDER — DOCUSATE SODIUM 100 MG
100 CAPSULE ORAL
Qty: 0 | Refills: 0 | Status: DISCONTINUED | OUTPATIENT
Start: 2018-01-26 | End: 2018-01-30

## 2018-01-26 RX ORDER — ATORVASTATIN CALCIUM 80 MG/1
10 TABLET, FILM COATED ORAL AT BEDTIME
Qty: 0 | Refills: 0 | Status: DISCONTINUED | OUTPATIENT
Start: 2018-01-26 | End: 2018-01-30

## 2018-01-26 RX ORDER — METOPROLOL TARTRATE 50 MG
50 TABLET ORAL DAILY
Qty: 0 | Refills: 0 | Status: DISCONTINUED | OUTPATIENT
Start: 2018-01-26 | End: 2018-01-30

## 2018-01-26 RX ORDER — GLUCAGON INJECTION, SOLUTION 0.5 MG/.1ML
1 INJECTION, SOLUTION SUBCUTANEOUS ONCE
Qty: 0 | Refills: 0 | Status: DISCONTINUED | OUTPATIENT
Start: 2018-01-26 | End: 2018-01-30

## 2018-01-26 RX ORDER — TACROLIMUS 5 MG/1
1 CAPSULE ORAL EVERY 12 HOURS
Qty: 0 | Refills: 0 | Status: DISCONTINUED | OUTPATIENT
Start: 2018-01-26 | End: 2018-01-30

## 2018-01-26 RX ORDER — SODIUM CHLORIDE 9 MG/ML
1000 INJECTION, SOLUTION INTRAVENOUS
Qty: 0 | Refills: 0 | Status: DISCONTINUED | OUTPATIENT
Start: 2018-01-26 | End: 2018-01-30

## 2018-01-26 RX ORDER — FAMOTIDINE 10 MG/ML
20 INJECTION INTRAVENOUS DAILY
Qty: 0 | Refills: 0 | Status: DISCONTINUED | OUTPATIENT
Start: 2018-01-26 | End: 2018-01-30

## 2018-01-26 RX ORDER — DABIGATRAN ETEXILATE MESYLATE 150 MG/1
150 CAPSULE ORAL EVERY 12 HOURS
Qty: 0 | Refills: 0 | Status: DISCONTINUED | OUTPATIENT
Start: 2018-01-26 | End: 2018-01-30

## 2018-01-26 RX ORDER — ACETAMINOPHEN 500 MG
325 TABLET ORAL ONCE
Qty: 0 | Refills: 0 | Status: COMPLETED | OUTPATIENT
Start: 2018-01-26 | End: 2018-01-26

## 2018-01-26 RX ORDER — CEFTRIAXONE 500 MG/1
1 INJECTION, POWDER, FOR SOLUTION INTRAMUSCULAR; INTRAVENOUS ONCE
Qty: 0 | Refills: 0 | Status: DISCONTINUED | OUTPATIENT
Start: 2018-01-26 | End: 2018-01-26

## 2018-01-26 RX ORDER — PIPERACILLIN AND TAZOBACTAM 4; .5 G/20ML; G/20ML
3.38 INJECTION, POWDER, LYOPHILIZED, FOR SOLUTION INTRAVENOUS EVERY 6 HOURS
Qty: 0 | Refills: 0 | Status: DISCONTINUED | OUTPATIENT
Start: 2018-01-27 | End: 2018-01-30

## 2018-01-26 RX ORDER — MYCOPHENOLATE MOFETIL 250 MG/1
750 CAPSULE ORAL EVERY 12 HOURS
Qty: 0 | Refills: 0 | Status: DISCONTINUED | OUTPATIENT
Start: 2018-01-26 | End: 2018-01-30

## 2018-01-26 RX ORDER — FUROSEMIDE 40 MG
40 TABLET ORAL DAILY
Qty: 0 | Refills: 0 | Status: DISCONTINUED | OUTPATIENT
Start: 2018-01-26 | End: 2018-01-30

## 2018-01-26 RX ORDER — SODIUM CHLORIDE 9 MG/ML
1000 INJECTION INTRAMUSCULAR; INTRAVENOUS; SUBCUTANEOUS ONCE
Qty: 0 | Refills: 0 | Status: COMPLETED | OUTPATIENT
Start: 2018-01-26 | End: 2018-01-26

## 2018-01-26 RX ORDER — DEXTROSE 50 % IN WATER 50 %
25 SYRINGE (ML) INTRAVENOUS ONCE
Qty: 0 | Refills: 0 | Status: DISCONTINUED | OUTPATIENT
Start: 2018-01-26 | End: 2018-01-30

## 2018-01-26 RX ORDER — HYDROCORTISONE 20 MG
50 TABLET ORAL ONCE
Qty: 0 | Refills: 0 | Status: COMPLETED | OUTPATIENT
Start: 2018-01-26 | End: 2018-01-26

## 2018-01-26 RX ORDER — BRIMONIDINE TARTRATE 2 MG/MG
1 SOLUTION/ DROPS OPHTHALMIC DAILY
Qty: 0 | Refills: 0 | Status: DISCONTINUED | OUTPATIENT
Start: 2018-01-26 | End: 2018-01-30

## 2018-01-26 RX ORDER — CEFTRIAXONE 500 MG/1
1000 INJECTION, POWDER, FOR SOLUTION INTRAMUSCULAR; INTRAVENOUS ONCE
Qty: 0 | Refills: 0 | Status: COMPLETED | OUTPATIENT
Start: 2018-01-26 | End: 2018-01-26

## 2018-01-26 RX ORDER — DEXTROSE 50 % IN WATER 50 %
12.5 SYRINGE (ML) INTRAVENOUS ONCE
Qty: 0 | Refills: 0 | Status: DISCONTINUED | OUTPATIENT
Start: 2018-01-26 | End: 2018-01-30

## 2018-01-26 RX ORDER — TACROLIMUS 5 MG/1
1 CAPSULE ORAL AT BEDTIME
Qty: 0 | Refills: 0 | Status: DISCONTINUED | OUTPATIENT
Start: 2018-01-26 | End: 2018-01-26

## 2018-01-26 RX ORDER — SODIUM CHLORIDE 9 MG/ML
500 INJECTION INTRAMUSCULAR; INTRAVENOUS; SUBCUTANEOUS ONCE
Qty: 0 | Refills: 0 | Status: COMPLETED | OUTPATIENT
Start: 2018-01-26 | End: 2018-01-26

## 2018-01-26 RX ORDER — PIPERACILLIN AND TAZOBACTAM 4; .5 G/20ML; G/20ML
3.38 INJECTION, POWDER, LYOPHILIZED, FOR SOLUTION INTRAVENOUS ONCE
Qty: 0 | Refills: 0 | Status: COMPLETED | OUTPATIENT
Start: 2018-01-26 | End: 2018-01-26

## 2018-01-26 RX ORDER — DILTIAZEM HCL 120 MG
300 CAPSULE, EXT RELEASE 24 HR ORAL DAILY
Qty: 0 | Refills: 0 | Status: DISCONTINUED | OUTPATIENT
Start: 2018-01-26 | End: 2018-01-30

## 2018-01-26 RX ORDER — CHOLECALCIFEROL (VITAMIN D3) 125 MCG
1000 CAPSULE ORAL DAILY
Qty: 0 | Refills: 0 | Status: DISCONTINUED | OUTPATIENT
Start: 2018-01-26 | End: 2018-01-30

## 2018-01-26 RX ORDER — MYCOPHENOLIC ACID 180 MG/1
720 TABLET, DELAYED RELEASE ORAL EVERY 12 HOURS
Qty: 0 | Refills: 0 | Status: DISCONTINUED | OUTPATIENT
Start: 2018-01-26 | End: 2018-01-26

## 2018-01-26 RX ORDER — AZITHROMYCIN 500 MG/1
500 TABLET, FILM COATED ORAL ONCE
Qty: 0 | Refills: 0 | Status: COMPLETED | OUTPATIENT
Start: 2018-01-26 | End: 2018-01-26

## 2018-01-26 RX ORDER — DEXTROSE 50 % IN WATER 50 %
1 SYRINGE (ML) INTRAVENOUS ONCE
Qty: 0 | Refills: 0 | Status: DISCONTINUED | OUTPATIENT
Start: 2018-01-26 | End: 2018-01-30

## 2018-01-26 RX ORDER — INSULIN LISPRO 100/ML
VIAL (ML) SUBCUTANEOUS
Qty: 0 | Refills: 0 | Status: DISCONTINUED | OUTPATIENT
Start: 2018-01-26 | End: 2018-01-30

## 2018-01-26 RX ORDER — CLOPIDOGREL BISULFATE 75 MG/1
75 TABLET, FILM COATED ORAL DAILY
Qty: 0 | Refills: 0 | Status: DISCONTINUED | OUTPATIENT
Start: 2018-01-26 | End: 2018-01-30

## 2018-01-26 RX ORDER — TIMOLOL 0.5 %
1 DROPS OPHTHALMIC (EYE) AT BEDTIME
Qty: 0 | Refills: 0 | Status: DISCONTINUED | OUTPATIENT
Start: 2018-01-26 | End: 2018-01-30

## 2018-01-26 RX ORDER — THIAMINE MONONITRATE (VIT B1) 100 MG
100 TABLET ORAL DAILY
Qty: 0 | Refills: 0 | Status: DISCONTINUED | OUTPATIENT
Start: 2018-01-26 | End: 2018-01-30

## 2018-01-26 RX ADMIN — SODIUM CHLORIDE 1000 MILLILITER(S): 9 INJECTION INTRAMUSCULAR; INTRAVENOUS; SUBCUTANEOUS at 19:33

## 2018-01-26 RX ADMIN — CEFTRIAXONE 1000 MILLIGRAM(S): 500 INJECTION, POWDER, FOR SOLUTION INTRAMUSCULAR; INTRAVENOUS at 12:26

## 2018-01-26 RX ADMIN — SODIUM CHLORIDE 2000 MILLILITER(S): 9 INJECTION INTRAMUSCULAR; INTRAVENOUS; SUBCUTANEOUS at 12:13

## 2018-01-26 RX ADMIN — AZITHROMYCIN 255 MILLIGRAM(S): 500 TABLET, FILM COATED ORAL at 13:54

## 2018-01-26 RX ADMIN — PIPERACILLIN AND TAZOBACTAM 200 GRAM(S): 4; .5 INJECTION, POWDER, LYOPHILIZED, FOR SOLUTION INTRAVENOUS at 18:29

## 2018-01-26 RX ADMIN — SODIUM CHLORIDE 2000 MILLILITER(S): 9 INJECTION INTRAMUSCULAR; INTRAVENOUS; SUBCUTANEOUS at 13:21

## 2018-01-26 RX ADMIN — Medication 325 MILLIGRAM(S): at 11:51

## 2018-01-26 RX ADMIN — Medication 50 MILLIGRAM(S): at 13:53

## 2018-01-26 NOTE — ED PROVIDER NOTE - ATTENDING CONTRIBUTION TO CARE
72y male h/o CAD, CHF, Afib, HTN, DM, kidney transplant, reports difficulty urinating for few days. No dysuria. No fever, chills, abd pain. No other complaints. HDS, well appearing, soft abd, no CVAT. Labs reviewed - WBC 14.5, small LEs on UA, elevated troponin (pt with no cp/sob; EKG appears changes from prior but no evidence of ischemia). Seen in ED by PMD Dr Sue who recommends cardiology, urology, ICU consults. Pt ultimately admitted to regional for further mgmt.

## 2018-01-26 NOTE — ED ADULT TRIAGE NOTE - OTHER COMPLAINTS
pt c.o inability to urinate x 4 days. hx kidney transplant 7 years ago. denies abd discomfort, no n/v/d. no fever/chills. pt c.o dysuria x 4 days. hx kidney transplant 7 years ago. denies abd discomfort, no n/v/d. no fever/chills.

## 2018-01-26 NOTE — ED ADULT NURSE NOTE - OBJECTIVE STATEMENT
Pt presented to ED with dysuria stating "I've been peeing only a little bit for the past 3 to 4 days." Pt has voided 50cc dark urine upon ED arrival, urine samples have been sent to lab. Pt denies abdominal pain, burning Pt presented to ED with dysuria stating "I've been peeing only a little bit for the past 3 to 4 days." Pt has voided 50cc dark urine upon ED arrival, urine samples have been sent to lab. Pt denies abdominal pain, burning urination N+V, chest pain, dizziness, palpitation, SOB nor any other symptom at this time. Pt reports that pt has taken Tylenol 650mg PO at 9AM. Abdomen distended and non-tender, +BS to all quadrants, respiration unlabored and even, skin warm and diaphoretic, pt has been placed on cardia monitor, Afib noted and MD Herman aware.

## 2018-01-26 NOTE — H&P ADULT - NSHPSOCIALHISTORY_GEN_ALL_CORE
Smokes 1ppd for >55 years. Drinks 200g vodka daily. Denies hx of withdrawal, seizures, or hospitalizations

## 2018-01-26 NOTE — ED ADULT NURSE NOTE - OTHER COMPLAINTS
pt c.o dysuria x 4 days. hx kidney transplant 7 years ago. denies abd discomfort, no n/v/d. no fever/chills.

## 2018-01-26 NOTE — H&P ADULT - HISTORY OF PRESENT ILLNESS
72M with Kidney transplant 2011, HFpEF, AS, Afib on pradaxa, CAD s/p WALYL to dLCx/pLCx (1/2017), HTN, HLD, BPH, DM and active smoker/drinker who presents with difficulty urinating for 3-4 days. He states he was in his usual state of health when earlier this week he started having increased dysuria and retention with associated incontinence. He stated that he had the sudden urge to urinate and would not have enough time to make it to the restroom. He stated that today he felt feverish, took Tylenol. Today he felt the urge to urinate but could not, and called Dr. Jason, who advised him to come to the ED.     He denies Chills, Nausea, Vomiting, Hematuria, Diarrhea, Constipation. He states he has had 2 weeks of progressive Dyspnea on exertion with associated cough.     In the ED T100.5, , /60, RR18 Q6Bde71%. The patient was given 2L NS Bolus, Ceftriaxone, Azithromycin, Tylenol, and Solu-cortef.

## 2018-01-26 NOTE — H&P ADULT - PROBLEM SELECTOR PLAN 4
Patient with HFpEF.  -Continue Metoprolol 50mg ER QD  -Continue Furosemide 40mg Qd  -ACE CI due to renal dysfunction

## 2018-01-26 NOTE — H&P ADULT - PROBLEM SELECTOR PLAN 9
F: No IVF as patient adequately resuscitated at this time, will continue to monitor. Fluids sparingly as patient has history of HFpEF  E: Replete PRN  N: DASH/TLC, consistent Carbs, Renal Transplant

## 2018-01-26 NOTE — H&P ADULT - ASSESSMENT
72M with Kidney transplant 2011, HFpEF, AS, Afib on pradaxa, CAD s/p WALLY to dLCx/pLCx (1/2017), HTN, HLD, BPH, DM and active smoker/drinker who presents with difficulty urinating for 3-4 days.

## 2018-01-26 NOTE — H&P ADULT - PROBLEM SELECTOR PLAN 10
P: Patient adequately PPX for VTE  C: FULL CODE  D: Admit to [facility] under Dr. Jason for management of infection

## 2018-01-26 NOTE — H&P ADULT - NSHPPHYSICALEXAM_GEN_ALL_CORE
VITALS  Vital Signs Last 24 Hrs  T(C): 37.8 (26 Jan 2018 14:28), Max: 38.6 (26 Jan 2018 11:32)  T(F): 100 (26 Jan 2018 14:28), Max: 101.5 (26 Jan 2018 11:32)  HR: 74 (26 Jan 2018 17:26) (71 - 121)  BP: 103/72 (26 Jan 2018 17:26) (92/62 - 111/60)  BP(mean): --  RR: 16 (26 Jan 2018 17:26) (16 - 18)  SpO2: 98% (26 Jan 2018 17:26) (95% - 100%)    I&O's Summary    26 Jan 2018 07:01  -  26 Jan 2018 17:55  --------------------------------------------------------  IN: 1000 mL / OUT: 300 mL / NET: 700 mL    CAPILLARY BLOOD GLUCOSE    PHYSICAL EXAM  General: A&Ox3; NAD; slightly diaphoretic  Head: NC/AT; PERRL; EOMI; anicteric sclera  Neck: Supple; no JVD  Respiratory: CTA B/L; no wheezes/crackles/rales auscultated w/ good air movement  Cardiovascular: Irregularly Rate and Rhythm, S1/S2, no r/m/g  Gastrointestinal: Soft; NTND w/out rebound tenderness or guarding; bowel sounds normal; obese appearing  Extremities: WWP; no edema or cyanosis; radial/pedal pulses palpable  Neurological:  CNII-XII grossly intact; no obvious focal deficits

## 2018-01-26 NOTE — H&P ADULT - PROBLEM SELECTOR PLAN 5
Patient with chronic AFib.   -Continue Metoprolol 50mg ER Qd  -Continue Diltiazem CD 300mg Qd  -Continue Pradaxa 150mg Q12h

## 2018-01-26 NOTE — PROGRESS NOTE ADULT - SUBJECTIVE AND OBJECTIVE BOX
HPI:  72M with Kidney transplant 2011, Afib on pradaxa, CAD s/p WALLY to dLCx/pLCx (1/2017), HTN, HLD, BPH, DM and active smoker/drinker who presents with difficulty urinating for 3-4 days. He states he was in his usual state of health when earlier this week he started having increased dysuria and retention with associated incontinence. He stated that he had the sudden urge to urinate and would not have enough time to make it to the restroom. He stated that today he felt feverish, took Tylenol. Today he felt the urge to urinate but could not, and called Dr. Jason, who advised him to come to the ED.     He denies Chills, Nausea, Vomiting, Hematuria, Diarrhea, Constipation. He states he has had 2 weeks of progressive Dyspnea on exertion with associated cough.     In the ED T100.5, , /60, RR18 K7Rmx84%. The patient was given 2L NS Bolus, Ceftriaxone, Azithromycin, Tylenol, and Solu-cortef. (26 Jan 2018 17:37)      ROS: A 10-point review of systems was otherwise negative.    PAST MEDICAL & SURGICAL HISTORY:  Congestive heart failure  Aortic stenosis  Coronary artery disease  AV fistula: Left forearm  Hyperlipidemia: Hyperlipidemia  Essential hypertension: HTN (hypertension)  Complication of transplanted kidney: s/p right sided renal transplant  Atrial fibrillation: Atrial fibrillation  Type 2 diabetes mellitus: Diabetes  AVF (arteriovenous fistula)  S/P primary angioplasty with coronary stent  History of kidney transplant: right      SOCIAL HISTORY:  FAMILY HISTORY:  No pertinent family history in first degree relatives      ALLERGIES: 	  Allergies    No Known Allergies    Intolerances        MEDICATIONS:  atorvastatin 10 milliGRAM(s) Oral at bedtime  brimonidine 0.2% Ophthalmic Solution 1 Drop(s) Both EYES daily  cholecalciferol 1000 Unit(s) Oral daily  clopidogrel Tablet 75 milliGRAM(s) Oral daily  dabigatran 150 milliGRAM(s) Oral every 12 hours  dextrose 5%. 1000 milliLiter(s) IV Continuous <Continuous>  dextrose 50% Injectable 12.5 Gram(s) IV Push once  dextrose 50% Injectable 25 Gram(s) IV Push once  dextrose 50% Injectable 25 Gram(s) IV Push once  dextrose Gel 1 Dose(s) Oral once PRN  diltiazem    milliGRAM(s) Oral daily  docusate sodium 100 milliGRAM(s) Oral two times a day  famotidine    Tablet 20 milliGRAM(s) Oral daily  furosemide    Tablet 40 milliGRAM(s) Oral daily  glucagon  Injectable 1 milliGRAM(s) IntraMuscular once PRN  insulin lispro (HumaLOG) corrective regimen sliding scale   SubCutaneous Before meals and at bedtime  metoprolol succinate ER 50 milliGRAM(s) Oral daily  sodium chloride 0.9% Bolus 500 milliLiter(s) IV Bolus once  tacrolimus 1 milliGRAM(s) Oral every 12 hours  thiamine 100 milliGRAM(s) Oral daily  timolol 0.5% Solution 1 Drop(s) Both EYES at bedtime  valsartan 320 milliGRAM(s) Oral daily      PHYSICAL EXAM:  T(C): 36.7 (01-26-18 @ 18:04), Max: 38.6 (01-26-18 @ 11:32)  HR: 54 (01-26-18 @ 18:04) (54 - 121)  BP: 100/67 (01-26-18 @ 18:04) (92/62 - 111/60)  RR: 18 (01-26-18 @ 18:04) (16 - 18)  SpO2: 97% (01-26-18 @ 18:04) (95% - 100%)  Wt(kg): --    GEN:  NAD.   HEENT: NCAT, PERRL, EOMI. Mucosa moist. No JVD.   RESP: CTA b/l  CV: Irreg, normal s1/s2. No m/r/g.  ABD: Soft, NTND. BS+  EXT: Warm. No edema, clubbing, or cyanosis.   NEURO: AAOx3. No focal deficits.    I&O's Summary    26 Jan 2018 07:01  -  26 Jan 2018 18:42  --------------------------------------------------------  IN: 1000 mL / OUT: 300 mL / NET: 700 mL      Height (cm): 172.72 (01-26 @ 11:22)  Weight (kg): 86.2 (01-26 @ 11:22)  BMI (kg/m2): 28.9 (01-26 @ 11:22)  BSA (m2): 2 (01-26 @ 11:22)  	  LABS:	 	    CARDIAC MARKERS:                                  13.5   14.5  )-----------( 126      ( 26 Jan 2018 11:53 )             39.2     01-26    126<L>  |  86<L>  |  26<H>  ----------------------------<  112<H>  3.9   |  20<L>  |  1.59<H>    Ca    10.0      26 Jan 2018 11:53    TPro  8.4<H>  /  Alb  4.5  /  TBili  1.2  /  DBili  x   /  AST  18  /  ALT  21  /  AlkPhos  53  01-26             ECG: AFib w/ RBBB w/ Limb lead reversal

## 2018-01-26 NOTE — ED PROVIDER NOTE - MEDICAL DECISION MAKING DETAILS
fever and urinary sx's. pt well appearing. febrile in ED. ceftriaxone given.  ecg a fib and changed compared to prior. pt without cardiac complaints. cardiology fellow consulted. pt evaluated in ED by Dr Titus, and  recommmend ct, us and nuclear med scan. recommend hydrocortisone 50mg as well. ? RLL infiltrate on cxr zithromax given. urology consulted. pt evaluated in ED by ICU team and recommend regional admission and zosyn

## 2018-01-26 NOTE — H&P ADULT - NSHPLABSRESULTS_GEN_ALL_CORE
LABS                        13.5   14.5  )-----------( 126      ( 2018 11:53 )             39.2         126<L>  |  86<L>  |  26<H>  ----------------------------<  112<H>  3.9   |  20<L>  |  1.59<H>    Ca    10.0      2018 11:53    TPro  8.4<H>  /  Alb  4.5  /  TBili  1.2  /  DBili  x   /  AST  18  /  ALT  21  /  AlkPhos  53      PT/INR - ( 2018 11:53 )   PT: 18.3 sec;   INR: 1.63          PTT - ( 2018 11:53 )  PTT:58.6 sec  Urinalysis Basic - ( 2018 11:41 )    Color: Yellow / Appearance: Clear / S.010 / pH: x  Gluc: x / Ketone: NEGATIVE  / Bili: Negative / Urobili: 0.2 E.U./dL   Blood: x / Protein: 30 mg/dL / Nitrite: NEGATIVE   Leuk Esterase: Trace / RBC: < 5 /HPF / WBC 5-10 /HPF   Sq Epi: x / Non Sq Epi: 5-10 /HPF / Bacteria: Present /HPF      CARDIAC MARKERS ( 2018 11:53 )  x     / 0.04 ng/mL / 124 U/L / x     / 1.5 ng/mL    < from: CT Abdomen and Pelvis No Cont (18 @ 15:08) >    Infiltration of the fat surrounding the transplant kidney in the right   iliac fossa of uncertain chronicity. Findingsmay be due to transplant   rejection, nephritis or polynephritis.    Cholelithiasis. No abnormal gallbladder distention. Small amount of fluid   around the gallbladder could be a portion of ascites. If there is right   upper quadrant pain, recommend ultrasound.    Aortic valvular calcification can be associated with aortic stenosis.    Mild hepatosplenomegaly. Mild retroperitoneal lymphadenopathy.    Small ascites.    < end of copied text >

## 2018-01-26 NOTE — CONSULT NOTE ADULT - SUBJECTIVE AND OBJECTIVE BOX
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): s/p right sided renal transplant  Atrial fibrillation (427.31): Atrial fibrillation  Type 2 diabetes mellitus (250.00): Diabetes  AVF (arteriovenous fistula) (I77.0)  S/P primary angioplasty with coronary stent (Z95.5)  History of kidney transplant (V42.0): right        Past Medical History:  Past Surgical History:  Medications:  Allergies:  Social History:  Family History:    Physical Examination:   Vital Signs Last 24 Hrs  T(C): 38.1 (2018 13:11), Max: 38.6 (2018 11:32)  T(F): 100.5 (2018 13:11), Max: 101.5 (2018 11:32)  HR: 79 (2018 13:11) (79 - 121)  BP: 94/60 (2018 13:11) (92/62 - 111/60)  BP(mean): --  RR: 18 (2018 13:11) (17 - 18)  SpO2: 95% (2018 13:11) (95% - 96%)  I&O's Detail    CAPILLARY BLOOD GLUCOSE        General:  HEENT:  Neck:  Heart:  Lungs:  Abdomen:  Rectal:  Back:  Genitourinary:  Extremities:  Neurological:  Skin:     Labs/Imaging:       CARDIAC MARKERS ( 2018 11:53 )  x     / 0.04 ng/mL / 124 U/L / x     / 1.5 ng/mL      CBC Full  -  ( 2018 11:53 )  WBC Count : 14.5 K/uL  Hemoglobin : 13.5 g/dL  Hematocrit : 39.2 %  Platelet Count - Automated : 126 K/uL  Mean Cell Volume : 91.4 fL  Mean Cell Hemoglobin : 31.5 pg  Mean Cell Hemoglobin Concentration : 34.4 g/dL  Auto Neutrophil # : x  Auto Lymphocyte # : x  Auto Monocyte # : x  Auto Eosinophil # : x  Auto Basophil # : x  Auto Neutrophil % : 75.0 %  Auto Lymphocyte % : 1.0 %  Auto Monocyte % : 7.0 %  Auto Eosinophil % : x  Auto Basophil % : x        126<L>  |  86<L>  |  26<H>  ----------------------------<  112<H>  3.9   |  20<L>  |  1.59<H>    Ca    10.0      2018 11:53    TPro  8.4<H>  /  Alb  4.5  /  TBili  1.2  /  DBili  x   /  AST  18  /  ALT  21  /  AlkPhos  53      LIVER FUNCTIONS - ( 2018 11:53 )  Alb: 4.5 g/dL / Pro: 8.4 g/dL / ALK PHOS: 53 U/L / ALT: 21 U/L / AST: 18 U/L / GGT: x           PT/INR - ( 2018 11:53 )   PT: 18.3 sec;   INR: 1.63          PTT - ( 2018 11:53 )  PTT:58.6 sec  Urinalysis Basic - ( 2018 11:41 )    Color: Yellow / Appearance: Clear / S.010 / pH: x  Gluc: x / Ketone: NEGATIVE  / Bili: Negative / Urobili: 0.2 E.U./dL   Blood: x / Protein: 30 mg/dL / Nitrite: NEGATIVE   Leuk Esterase: Trace / RBC: < 5 /HPF / WBC 5-10 /HPF   Sq Epi: x / Non Sq Epi: 5-10 /HPF / Bacteria: Present /HPF ICU Consult Medicine Resident Note    Patient is a 72 year old male, poor historian, with a PMHx of kidney transplant , dCHF, AS, Afib on pradaxa, CAD s/p WALLY to dLCx/pLCx (2017), HTN, HLD, BPH, DM and active smoker/drinker who presents with 3 days of burning with urination and urinary retention. He states that he was in his usual state of health until 1-2 weeks ago when he began to have subjective shortness of breath and cough with exertion. He would walk 3 blocks and become winded causing him to stop. Of note, he was admitted here 1 month ago for PNA and completed full course of azithromycin, in which he stated he felt back to his baseline. The cough continued to persist. Beginning earlier this week, he stated that he began having urinary incontinence, and he would urinate on himself. He stated that he had the sudden urge to urinate and would not have enough time to make it to the restroom. He stated that today he felt feverish, took Tylenol. Today he felt the urge to urinate but could not, and called Dr. Jason, who advised him to come to the ED.     When in ED:   Vital Signs Last 24 Hrs  T(C): 37.8 (2018 14:28), Max: 38.6 (2018 11:32)  T(F): 100 (2018 14:28), Max: 101.5 (2018 11:32)  HR: 71 (2018 15:00) (71 - 121)  BP: 104/62 (2018 15:00) (92/62 - 111/60)  BP(mean): --  ABP: --  ABP(mean): --  RR: 18 (2018 15:00) (17 - 18)  SpO2: 97% (2018 15:00) (95% - 100%)    Given:   Ceftriaxone for UTI, Azithromycin for ?PNA, Solu-Cortef for hypotension, and 2LNS    PAST MEDICAL & SURGICAL HISTORY:  Congestive heart failure (I50.9)  Aortic stenosis (I35.0)  Coronary artery disease (I25.10)  AV fistula (I77.0): Left forearm  Hyperlipidemia (272.4): Hyperlipidemia  Essential hypertension (401.9): HTN (hypertension)  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): s/p right sided renal transplant  Atrial fibrillation (427.31): Atrial fibrillation  Type 2 diabetes mellitus (250.00): Diabetes  AVF (arteriovenous fistula) (I77.0)  S/P primary angioplasty with coronary stent (Z95.5)  History of kidney transplant (V42.0): right        Past Medical History:  Past Surgical History:  Medications:  Allergies:  Social History:  Family History:    Physical Examination:   Vital Signs Last 24 Hrs  T(C): 38.1 (2018 13:11), Max: 38.6 (2018 11:32)  T(F): 100.5 (2018 13:11), Max: 101.5 (2018 11:32)  HR: 79 (2018 13:11) (79 - 121)  BP: 94/60 (2018 13:11) (92/62 - 111/60)  BP(mean): --  RR: 18 (2018 13:11) (17 - 18)  SpO2: 95% (2018 13:11) (95% - 96%)  I&O's Detail    CAPILLARY BLOOD GLUCOSE    General: NAD, WDWN  HEENT: MMM  Neck: No JVD  Heart: irregularly irregular, s1s2, 2/6 c-d systolic ejection murmur at RUSB, 2/6 holosystolic murmur at 5th IC space at midclavicular line. PMI displaced to left, no parasternal heave.    Lungs: CTA bilaterally   Abdomen: distended, NT, mildly tympanic  Back: No cva tenderness  Genitourinary: No suprapubic tenderness, normal external genitalia    Extremities: WWP, venous stasis changes  Neurological: AAOx3  Skin:  Intact    Labs/Imaging:       CARDIAC MARKERS ( 2018 11:53 )  x     / 0.04 ng/mL / 124 U/L / x     / 1.5 ng/mL      CBC Full  -  ( 2018 11:53 )  WBC Count : 14.5 K/uL  Hemoglobin : 13.5 g/dL  Hematocrit : 39.2 %  Platelet Count - Automated : 126 K/uL  Mean Cell Volume : 91.4 fL  Mean Cell Hemoglobin : 31.5 pg  Mean Cell Hemoglobin Concentration : 34.4 g/dL  Auto Neutrophil # : x  Auto Lymphocyte # : x  Auto Monocyte # : x  Auto Eosinophil # : x  Auto Basophil # : x  Auto Neutrophil % : 75.0 %  Auto Lymphocyte % : 1.0 %  Auto Monocyte % : 7.0 %  Auto Eosinophil % : x  Auto Basophil % : x        126<L>  |  86<L>  |  26<H>  ----------------------------<  112<H>  3.9   |  20<L>  |  1.59<H>    Ca    10.0      2018 11:53    TPro  8.4<H>  /  Alb  4.5  /  TBili  1.2  /  DBili  x   /  AST  18  /  ALT  21  /  AlkPhos  53      LIVER FUNCTIONS - ( 2018 11:53 )  Alb: 4.5 g/dL / Pro: 8.4 g/dL / ALK PHOS: 53 U/L / ALT: 21 U/L / AST: 18 U/L / GGT: x           PT/INR - ( 2018 11:53 )   PT: 18.3 sec;   INR: 1.63          PTT - ( 2018 11:53 )  PTT:58.6 sec  Urinalysis Basic - ( 2018 11:41 )    Color: Yellow / Appearance: Clear / S.010 / pH: x  Gluc: x / Ketone: NEGATIVE  / Bili: Negative / Urobili: 0.2 E.U./dL   Blood: x / Protein: 30 mg/dL / Nitrite: NEGATIVE   Leuk Esterase: Trace / RBC: < 5 /HPF / WBC 5-10 /HPF   Sq Epi: x / Non Sq Epi: 5-10 /HPF / Bacteria: Present /HPF ICU Consult Medicine Resident Note    Patient is a 72 year old male, poor historian, with a PMHx of kidney transplant , dCHF, AS, Afib on pradaxa, CAD s/p WALLY to dLCx/pLCx (2017), HTN, HLD, BPH, DM and active smoker/drinker who presents with 3 days of burning with urination and urinary retention. He states that he was in his usual state of health until 1-2 weeks ago when he began to have subjective shortness of breath and cough with exertion. He would walk 3 blocks and become winded causing him to stop. Of note, he was admitted here 1 month ago for PNA and completed full course of azithromycin, in which he stated he felt back to his baseline. The cough continued to persist. Beginning earlier this week, he stated that he began having urinary incontinence, and he would urinate on himself. He stated that he had the sudden urge to urinate and would not have enough time to make it to the restroom. He stated that today he felt feverish, took Tylenol. Today he felt the urge to urinate but could not, and called Dr. Jason, who advised him to come to the ED.     When in ED:   Vital Signs Last 24 Hrs  T(C): 37.8 (2018 14:28), Max: 38.6 (2018 11:32)  T(F): 100 (2018 14:28), Max: 101.5 (2018 11:32)  HR: 71 (2018 15:00) (71 - 121)  BP: 104/62 (2018 15:00) (92/62 - 111/60)  BP(mean): --  ABP: --  ABP(mean): --  RR: 18 (2018 15:00) (17 - 18)  SpO2: 97% (2018 15:00) (95% - 100%)    Given:   Ceftriaxone for UTI, Azithromycin for ?PNA, Solu-Cortef for hypotension, and 2LNS    PAST MEDICAL & SURGICAL HISTORY:  Congestive heart failure (I50.9)  Aortic stenosis (I35.0)  Coronary artery disease (I25.10)  AV fistula (I77.0): Left forearm  Hyperlipidemia (272.4): Hyperlipidemia  Essential hypertension (401.9): HTN (hypertension)  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 States he quit 1 month ago, but >55 pack year history.     Physical Examination:   Vital Signs Last 24 Hrs  T(C): 38.1 (2018 13:11), Max: 38.6 (2018 11:32)  T(F): 100.5 (2018 13:11), Max: 101.5 (2018 11:32)  HR: 79 (2018 13:11) (79 - 121)  BP: 94/60 (2018 13:11) (92/62 - 111/60)  BP(mean): --  RR: 18 (2018 13:11) (17 - 18)  SpO2: 95% (2018 13:11) (95% - 96%)  I&O's Detail    CAPILLARY BLOOD GLUCOSE    General: NAD, WDWN  HEENT: MMM  Neck: No JVD  Heart: irregularly irregular, s1s2, 2/6 c-d systolic ejection murmur at RUSB, 2/6 holosystolic murmur at 5th IC space at midclavicular line. PMI displaced to left, no parasternal heave.    Lungs: CTA bilaterally   Abdomen: distended, NT, mildly tympanic  Back: No cva tenderness  Genitourinary: No suprapubic tenderness, normal external genitalia    Extremities: WWP, venous stasis changes  Neurological: AAOx3  Skin:  Intact    Labs/Imaging:    CARDIAC MARKERS ( 2018 11:53 )  x     / 0.04 ng/mL / 124 U/L / x     / 1.5 ng/mL      CBC Full  -  ( 2018 11:53 )  WBC Count : 14.5 K/uL  Hemoglobin : 13.5 g/dL  Hematocrit : 39.2 %  Platelet Count - Automated : 126 K/uL  Mean Cell Volume : 91.4 fL  Mean Cell Hemoglobin : 31.5 pg  Mean Cell Hemoglobin Concentration : 34.4 g/dL  Auto Neutrophil # : x  Auto Lymphocyte # : x  Auto Monocyte # : x  Auto Eosinophil # : x  Auto Basophil # : x  Auto Neutrophil % : 75.0 %  Auto Lymphocyte % : 1.0 %  Auto Monocyte % : 7.0 %  Auto Eosinophil % : x  Auto Basophil % : x        126<L>  |  86<L>  |  26<H>  ----------------------------<  112<H>  3.9   |  20<L>  |  1.59<H>    Ca    10.0      2018 11:53    TPro  8.4<H>  /  Alb  4.5  /  TBili  1.2  /  DBili  x   /  AST  18  /  ALT  21  /  AlkPhos  53      LIVER FUNCTIONS - ( 2018 11:53 )  Alb: 4.5 g/dL / Pro: 8.4 g/dL / ALK PHOS: 53 U/L / ALT: 21 U/L / AST: 18 U/L / GGT: x           PT/INR - ( 2018 11:53 )   PT: 18.3 sec;   INR: 1.63          PTT - ( 2018 11:53 )  PTT:58.6 sec  Urinalysis Basic - ( 2018 11:41 )    Color: Yellow / Appearance: Clear / S.010 / pH: x  Gluc: x / Ketone: NEGATIVE  / Bili: Negative / Urobili: 0.2 E.U./dL   Blood: x / Protein: 30 mg/dL / Nitrite: NEGATIVE   Leuk Esterase: Trace / RBC: < 5 /HPF / WBC 5-10 /HPF   Sq Epi: x / Non Sq Epi: 5-10 /HPF / Bacteria: Present /HPF    Assessment:    72 year old male, poor historian, with a PMHx of kidney transplant , dCHF, AS, Afib on pradaxa, CAD s/p WALLY to dLCx/pLCx (2017), HTN, HLD, BPH, DM and active smoker/drinker who presents with 3 days of burning with urination and urinary retention, with severe sepsis (3/4 SIRS with troponinemia) most likely due to UTI.   When first seen, patient was tachycardic, hypotensive to SBP 90s, febrile, with leukocytosis, which subsequently resolved with treatment with fluids and antibiotics.   At this time, patient hemodynamically stable, with tachycardia, BP improving, and has defervesced ICU Consult Medicine Resident Note    Patient is a 72 year old male, poor historian, with a PMHx of kidney transplant , dCHF, AS, Afib on pradaxa, CAD s/p WALLY to dLCx/pLCx (2017), HTN, HLD, BPH, DM and active smoker/drinker who presents with 3 days of burning with urination and urinary retention. He states that he was in his usual state of health until 1-2 weeks ago when he began to have subjective shortness of breath and cough with exertion. He would walk 3 blocks and become winded causing him to stop. Of note, he was admitted here 1 month ago for PNA and completed full course of azithromycin, in which he stated he felt back to his baseline. The cough continued to persist. Beginning earlier this week, he stated that he began having urinary incontinence, and he would urinate on himself. He stated that he had the sudden urge to urinate and would not have enough time to make it to the restroom. He stated that today he felt feverish, took Tylenol. Today he felt the urge to urinate but could not, and called Dr. Jason, who advised him to come to the ED.     When in ED:   Vital Signs Last 24 Hrs  T(C): 37.8 (2018 14:28), Max: 38.6 (2018 11:32)  T(F): 100 (2018 14:28), Max: 101.5 (2018 11:32)  HR: 71 (2018 15:00) (71 - 121)  BP: 104/62 (2018 15:00) (92/62 - 111/60)  BP(mean): --  ABP: --  ABP(mean): --  RR: 18 (2018 15:00) (17 - 18)  SpO2: 97% (2018 15:00) (95% - 100%)    Given:   Ceftriaxone for UTI, Azithromycin for ?PNA, Solu-Cortef for hypotension, and 2LNS    PAST MEDICAL & SURGICAL HISTORY:  Congestive heart failure (I50.9)  Aortic stenosis (I35.0)  Coronary artery disease (I25.10)  AV fistula (I77.0): Left forearm  Hyperlipidemia (272.4): Hyperlipidemia  Essential hypertension (401.9): HTN (hypertension)  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 States he quit 1 month ago, but >55 pack year history.     Physical Examination:   Vital Signs Last 24 Hrs  T(C): 38.1 (2018 13:11), Max: 38.6 (2018 11:32)  T(F): 100.5 (2018 13:11), Max: 101.5 (2018 11:32)  HR: 79 (2018 13:11) (79 - 121)  BP: 94/60 (2018 13:11) (92/62 - 111/60)  BP(mean): --  RR: 18 (2018 13:11) (17 - 18)  SpO2: 95% (2018 13:11) (95% - 96%)  I&O's Detail    CAPILLARY BLOOD GLUCOSE    General: NAD, WDWN  HEENT: MMM  Neck: No JVD  Heart: irregularly irregular, s1s2, 2/6 c-d systolic ejection murmur at RUSB, 2/6 holosystolic murmur at 5th IC space at midclavicular line. PMI displaced to left, no parasternal heave.    Lungs: CTA bilaterally   Abdomen: distended, NT, mildly tympanic  Back: No cva tenderness  Genitourinary: No suprapubic tenderness, normal external genitalia    Extremities: WWP, venous stasis changes  Neurological: AAOx3  Skin:  Intact    Labs/Imaging:    CARDIAC MARKERS ( 2018 11:53 )  x     / 0.04 ng/mL / 124 U/L / x     / 1.5 ng/mL      CBC Full  -  ( 2018 11:53 )  WBC Count : 14.5 K/uL  Hemoglobin : 13.5 g/dL  Hematocrit : 39.2 %  Platelet Count - Automated : 126 K/uL  Mean Cell Volume : 91.4 fL  Mean Cell Hemoglobin : 31.5 pg  Mean Cell Hemoglobin Concentration : 34.4 g/dL  Auto Neutrophil # : x  Auto Lymphocyte # : x  Auto Monocyte # : x  Auto Eosinophil # : x  Auto Basophil # : x  Auto Neutrophil % : 75.0 %  Auto Lymphocyte % : 1.0 %  Auto Monocyte % : 7.0 %  Auto Eosinophil % : x  Auto Basophil % : x        126<L>  |  86<L>  |  26<H>  ----------------------------<  112<H>  3.9   |  20<L>  |  1.59<H>    Ca    10.0      2018 11:53    TPro  8.4<H>  /  Alb  4.5  /  TBili  1.2  /  DBili  x   /  AST  18  /  ALT  21  /  AlkPhos  53      LIVER FUNCTIONS - ( 2018 11:53 )  Alb: 4.5 g/dL / Pro: 8.4 g/dL / ALK PHOS: 53 U/L / ALT: 21 U/L / AST: 18 U/L / GGT: x           PT/INR - ( 2018 11:53 )   PT: 18.3 sec;   INR: 1.63          PTT - ( 2018 11:53 )  PTT:58.6 sec  Urinalysis Basic - ( 2018 11:41 )    Color: Yellow / Appearance: Clear / S.010 / pH: x  Gluc: x / Ketone: NEGATIVE  / Bili: Negative / Urobili: 0.2 E.U./dL   Blood: x / Protein: 30 mg/dL / Nitrite: NEGATIVE   Leuk Esterase: Trace / RBC: < 5 /HPF / WBC 5-10 /HPF   Sq Epi: x / Non Sq Epi: 5-10 /HPF / Bacteria: Present /HPF    Assessment & Plan:    72 year old male, poor historian, with a PMHx of kidney transplant , dCHF, AS, Afib on pradaxa, CAD s/p WALLY to dLCx/pLCx (2017), HTN, HLD, BPH, DM and active smoker/drinker who presents with 3 days of burning with urination and urinary retention, with severe sepsis (3/4 SIRS with troponinemia) most likely due to UTI.   When first seen, patient was tachycardic, hypotensive to SBP 90s, febrile, with leukocytosis, which subsequently resolved with treatment with fluids and antibiotics.   At this time, patient hemodynamically stable, with tachycardia, BP improving, and has defervesced.  Would give total 2.6L NS to fully resuscitate; already received 2L. Would also place on maintenance fluid as he is liley dehydrated, and to fluid challenge to produce urine.   Please place hartmann catheter to monitor I&O.   Would empirically treat with Zosyn 3.375mg, and deescalate when cultures come back.   Would have primary team discuss with nephrology on whether to continue on Tacrolimus/Mycophenalate at this time, ICU Consult Medicine Resident Note    Patient is a 72 year old male, poor historian, with a PMHx of kidney transplant , dCHF, AS, Afib on pradaxa, CAD s/p WALLY to dLCx/pLCx (2017), HTN, HLD, BPH, DM and active smoker/drinker who presents with 3 days of burning with urination and urinary retention. He states that he was in his usual state of health until 1-2 weeks ago when he began to have subjective shortness of breath and cough with exertion. He would walk 3 blocks and become winded causing him to stop. Of note, he was admitted here 1 month ago for PNA and completed full course of azithromycin, in which he stated he felt back to his baseline. The cough continued to persist. Beginning earlier this week, he stated that he began having urinary incontinence, and he would urinate on himself. He stated that he had the sudden urge to urinate and would not have enough time to make it to the restroom. He stated that today he felt feverish, took Tylenol. Today he felt the urge to urinate but could not, and called Dr. Jason, who advised him to come to the ED.     When in ED:   Vital Signs Last 24 Hrs  T(C): 37.8 (2018 14:28), Max: 38.6 (2018 11:32)  T(F): 100 (2018 14:28), Max: 101.5 (2018 11:32)  HR: 71 (2018 15:00) (71 - 121)  BP: 104/62 (2018 15:00) (92/62 - 111/60)  BP(mean): --  ABP: --  ABP(mean): --  RR: 18 (2018 15:00) (17 - 18)  SpO2: 97% (2018 15:00) (95% - 100%)    Given:   Ceftriaxone for UTI, Azithromycin for ?PNA, Solu-Cortef for hypotension, and 2LNS    PAST MEDICAL & SURGICAL HISTORY:  Congestive heart failure (I50.9)  Aortic stenosis (I35.0)  Coronary artery disease (I25.10)  AV fistula (I77.0): Left forearm  Hyperlipidemia (272.4): Hyperlipidemia  Essential hypertension (401.9): HTN (hypertension)  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 States he quit 1 month ago, but >55 pack year history.     Physical Examination:   Vital Signs Last 24 Hrs  T(C): 38.1 (2018 13:11), Max: 38.6 (2018 11:32)  T(F): 100.5 (2018 13:11), Max: 101.5 (2018 11:32)  HR: 79 (2018 13:11) (79 - 121)  BP: 94/60 (2018 13:11) (92/62 - 111/60)  BP(mean): --  RR: 18 (2018 13:11) (17 - 18)  SpO2: 95% (2018 13:11) (95% - 96%)  I&O's Detail    CAPILLARY BLOOD GLUCOSE    General: NAD, WDWN  HEENT: MMM  Neck: No JVD  Heart: irregularly irregular, s1s2, 2/6 c-d systolic ejection murmur at RUSB, 2/6 holosystolic murmur at 5th IC space at midclavicular line. PMI displaced to left, no parasternal heave.    Lungs: CTA bilaterally   Abdomen: distended, NT, mildly tympanic  Back: No cva tenderness  Genitourinary: No suprapubic tenderness, normal external genitalia    Extremities: WWP, venous stasis changes  Neurological: AAOx3  Skin:  Intact    Labs/Imaging:    CARDIAC MARKERS ( 2018 11:53 )  x     / 0.04 ng/mL / 124 U/L / x     / 1.5 ng/mL      CBC Full  -  ( 2018 11:53 )  WBC Count : 14.5 K/uL  Hemoglobin : 13.5 g/dL  Hematocrit : 39.2 %  Platelet Count - Automated : 126 K/uL  Mean Cell Volume : 91.4 fL  Mean Cell Hemoglobin : 31.5 pg  Mean Cell Hemoglobin Concentration : 34.4 g/dL  Auto Neutrophil # : x  Auto Lymphocyte # : x  Auto Monocyte # : x  Auto Eosinophil # : x  Auto Basophil # : x  Auto Neutrophil % : 75.0 %  Auto Lymphocyte % : 1.0 %  Auto Monocyte % : 7.0 %  Auto Eosinophil % : x  Auto Basophil % : x        126<L>  |  86<L>  |  26<H>  ----------------------------<  112<H>  3.9   |  20<L>  |  1.59<H>    Ca    10.0      2018 11:53    TPro  8.4<H>  /  Alb  4.5  /  TBili  1.2  /  DBili  x   /  AST  18  /  ALT  21  /  AlkPhos  53      LIVER FUNCTIONS - ( 2018 11:53 )  Alb: 4.5 g/dL / Pro: 8.4 g/dL / ALK PHOS: 53 U/L / ALT: 21 U/L / AST: 18 U/L / GGT: x           PT/INR - ( 2018 11:53 )   PT: 18.3 sec;   INR: 1.63          PTT - ( 2018 11:53 )  PTT:58.6 sec  Urinalysis Basic - ( 2018 11:41 )    Color: Yellow / Appearance: Clear / S.010 / pH: x  Gluc: x / Ketone: NEGATIVE  / Bili: Negative / Urobili: 0.2 E.U./dL   Blood: x / Protein: 30 mg/dL / Nitrite: NEGATIVE   Leuk Esterase: Trace / RBC: < 5 /HPF / WBC 5-10 /HPF   Sq Epi: x / Non Sq Epi: 5-10 /HPF / Bacteria: Present /HPF    Assessment & Plan:    72 year old male, poor historian, with a PMHx of kidney transplant , dCHF, AS, Afib on pradaxa, CAD s/p WALLY to dLCx/pLCx (2017), HTN, HLD, BPH, DM and active smoker/drinker who presents with 3 days of burning with urination and urinary retention, with severe sepsis (3/4 SIRS with troponinemia, divine) most likely due to UTI.   When first seen, patient was tachycardic, hypotensive to SBP 90s, febrile, with leukocytosis, which subsequently resolved with treatment with fluids and antibiotics.   Ultrasound showed medical renal disease. CT chest unremarkable. CT a/p showing fat stranding around transplanted kidney.   At this time, patient hemodynamically stable, with tachycardia, BP improving, and has defervesced.  Would give total 2.6L NS to fully resuscitate; already received 2L. Would also place on maintenance fluid as he is likely dehydrated, and to fluid challenge to produce urine.   Please place hartmann catheter to monitor I&O.   Would empirically treat with Zosyn 3.375mg, and deescalate when cultures come back.   Would have primary team discuss with nephrology on whether to continue on Tacrolimus/Mycophenalate at this time  Hold nephrotoxic agents.     Patient is deemed stable for regional medical floor, with admission under medicine team.     Discussed with Dr. Moreira, and ICU fellow Dr. Lynn     If any questions or concerns, please reconsult.

## 2018-01-26 NOTE — ED ADULT NURSE REASSESSMENT NOTE - NS ED NURSE REASSESS COMMENT FT1
Patient returns from [facility] at this time, a/o X3, no pain or other symptom complaint.  Vital signs stable.  NSS 2 L bolus ongoing, tolerating well.  For repeat labs after IVF completed.  Stable and comfortable.

## 2018-01-26 NOTE — ED ADULT NURSE REASSESSMENT NOTE - NS ED NURSE REASSESS COMMENT FT1
Pt is resting on bed comfortably at this time. Pending for US. As per FLOR Carl, no Connelly at this time. Plan of care has been explained to pt and pt verbalized understanding. Will continue monitoring.

## 2018-01-26 NOTE — ED PROVIDER NOTE - OBJECTIVE STATEMENT
72 y./o male with hx of CAD, A fib, CHF, HTN, DM, s/p renal transplant c/o difficulty urinating x 3-4 days. pt notes small amount of urine coming out. no dysuria. no testicular pain. no n/v./d. no abd pain. no back cynthia. no ha or dizziness. no URI sx's. pt noted to be febrile in ED but denies fever at home. no further complaints.

## 2018-01-26 NOTE — H&P ADULT - PROBLEM SELECTOR PLAN 2
Patient with Kidney transplant. As per imaging transplanted kidney shows no hydronephrosis. There is decreased filling/emptying but kidney shows good blood flow. CT concerning for nephritis vs. rejection  -Tacrolimus 1mg BID  -Mycophenolic Acid 720mg ER

## 2018-01-26 NOTE — H&P ADULT - ATTENDING COMMENTS
pt seen in ER upon adm for decreased uo and fever in setting of prior renal transplant.   pt has h/o mult comorbidities including DM, arrythmia,  and heart disease. as noted above.  pt arrived with c/o days of urinary urgency and hesitancy, and was finally able to micturate minimally in ER after which pvr was 92 cc.   pt initially hypotensive, has been given fluids with response.  exam otherwise unrevealing.   initial imaging shows ct with perinephric changes which could reflect inflammation or rejection.  renal scan, discussed with dr jackman, reveals adequate blood flow and no obsruction.   renal fct  has declined since adm weeks ago for resp infection.    wbc is up, and chest x ray raises question as to possible infiltrate.   begun initially on azithromycin and ceftriaxone.   would suspect possible uti with transplant pyelo, which has occurred also in past.  will await cultures and watch course.  have discussed adm plans with icu team, who feels that at this point with acceptable hemodynamics we can initiate rx on RMF and notify them of progress if necessary.   discussed with ER team, ICU, and floor staff.

## 2018-01-26 NOTE — ED ADULT NURSE NOTE - CHPI ED SYMPTOMS NEG
no hematuria/no nausea/no chills/no diarrhea/no blood in stool/no burning urination/no vomiting/no abdominal distension

## 2018-01-26 NOTE — H&P ADULT - PROBLEM SELECTOR PLAN 3
Patient with hyponatremia on admission. Likely secondary to hypovoluemic hyponatremia now s/p fluid resuscitation  -Urine Na/Urine Osm  -Trend BMP

## 2018-01-26 NOTE — CONSULT NOTE ADULT - SUBJECTIVE AND OBJECTIVE BOX
CONSULT    Patient is a 72y old  Male who presents with a chief complaint of fever and elevated cr    HPI: 72M with extensive PMH including, but not limited to, s/p kidney transplant (2011) who presented with elevated cr and fever, SOB x 2wks. Per pt's chart he has been c/o incontinence and dysuria for the last week but when interviewed by GIOVANNY RAY he reports that he had some "deep" pain (not burning) with urination but says it has since resolved. He says he is urinating without any problems now. Pt denies f/c, n/v/d/c, hematuria, incontinence, dysuria, retention.      Vital Signs Last 24 Hrs  T(C): 37.8 (26 Jan 2018 14:28), Max: 38.6 (26 Jan 2018 11:32)  T(F): 100 (26 Jan 2018 14:28), Max: 101.5 (26 Jan 2018 11:32)  HR: 74 (26 Jan 2018 17:26) (71 - 121)  BP: 103/72 (26 Jan 2018 17:26) (92/62 - 111/60)  BP(mean): --  RR: 16 (26 Jan 2018 17:26) (16 - 18)  SpO2: 98% (26 Jan 2018 17:26) (95% - 100%)  I&O's Summary    26 Jan 2018 07:01  -  26 Jan 2018 17:53  --------------------------------------------------------  IN: 1000 mL / OUT: 300 mL / NET: 700 mL        PE:  Gen: NAD  Abd: +obese, soft, NTTP, no guarding  : nml external male circumsized genitalia, no blood or d/c from meatus, testicles with normal lie b/l  WADE: deferred    LABS:                        13.5   14.5  )-----------( 126      ( 26 Jan 2018 11:53 )             39.2     01-26    126<L>  |  86<L>  |  26<H>  ----------------------------<  112<H>  3.9   |  20<L>  |  1.59<H>    Ca    10.0      26 Jan 2018 11:53    TPro  8.4<H>  /  Alb  4.5  /  TBili  1.2  /  DBili  x   /  AST  18  /  ALT  21  /  AlkPhos  53  01-26    PT/INR - ( 26 Jan 2018 11:53 )   PT: 18.3 sec;   INR: 1.63          PTT - ( 26 Jan 2018 11:53 )  PTT:58.6 sec  Cultures      A/P  72M poor historian denies any LUTS when examined by  but in light of urinary complaints to other practitioners and borderline positive UA, pt likely with symptomatic UTI. Pt voiding well but required PVR to r/o retention. To be admitted  -serial PVRs to r/o retention. If PVRs increasing, pt unable to void or complaining of pain d/t retention, please place hartmann catheter  -abx for UTI  -f/u Ucxs  -OOB/ambulate as tolerated and bowel regimen  - will continue to follow as inpt

## 2018-01-27 LAB
ANION GAP SERPL CALC-SCNC: 15 MMOL/L — SIGNIFICANT CHANGE UP (ref 5–17)
BUN SERPL-MCNC: 27 MG/DL — HIGH (ref 7–23)
CALCIUM SERPL-MCNC: 8.6 MG/DL — SIGNIFICANT CHANGE UP (ref 8.4–10.5)
CHLORIDE SERPL-SCNC: 91 MMOL/L — LOW (ref 96–108)
CO2 SERPL-SCNC: 22 MMOL/L — SIGNIFICANT CHANGE UP (ref 22–31)
CREAT SERPL-MCNC: 1.55 MG/DL — HIGH (ref 0.5–1.3)
E CLOAC COMP DNA BLD POS QL NAA+PROBE: SIGNIFICANT CHANGE UP
ENTEROCOC DNA BLD POS QL NAA+NON-PROBE: SIGNIFICANT CHANGE UP
GLUCOSE SERPL-MCNC: 78 MG/DL — SIGNIFICANT CHANGE UP (ref 70–99)
GRAM STN FLD: SIGNIFICANT CHANGE UP
GRAM STN FLD: SIGNIFICANT CHANGE UP
HCT VFR BLD CALC: 32.7 % — LOW (ref 39–50)
HGB BLD-MCNC: 10.7 G/DL — LOW (ref 13–17)
LYMPHOCYTES # BLD AUTO: 1 % — LOW (ref 13–44)
MAGNESIUM SERPL-MCNC: 1.4 MG/DL — LOW (ref 1.6–2.6)
MCHC RBC-ENTMCNC: 30.1 PG — SIGNIFICANT CHANGE UP (ref 27–34)
MCHC RBC-ENTMCNC: 32.7 G/DL — SIGNIFICANT CHANGE UP (ref 32–36)
MCV RBC AUTO: 91.9 FL — SIGNIFICANT CHANGE UP (ref 80–100)
METHOD TYPE: SIGNIFICANT CHANGE UP
MONOCYTES NFR BLD AUTO: 9 % — SIGNIFICANT CHANGE UP (ref 2–14)
NEUTROPHILS NFR BLD AUTO: 79 % — HIGH (ref 43–77)
PHOSPHATE SERPL-MCNC: 2.4 MG/DL — LOW (ref 2.5–4.5)
PLATELET # BLD AUTO: 108 K/UL — LOW (ref 150–400)
POTASSIUM SERPL-MCNC: 3.6 MMOL/L — SIGNIFICANT CHANGE UP (ref 3.5–5.3)
POTASSIUM SERPL-SCNC: 3.6 MMOL/L — SIGNIFICANT CHANGE UP (ref 3.5–5.3)
RBC # BLD: 3.56 M/UL — LOW (ref 4.2–5.8)
RBC # FLD: 15.3 % — SIGNIFICANT CHANGE UP (ref 10.3–16.9)
SODIUM SERPL-SCNC: 128 MMOL/L — LOW (ref 135–145)
TACROLIMUS SERPL-MCNC: 5.9 NG/ML — SIGNIFICANT CHANGE UP
WBC # BLD: 8.2 K/UL — SIGNIFICANT CHANGE UP (ref 3.8–10.5)
WBC # FLD AUTO: 8.2 K/UL — SIGNIFICANT CHANGE UP (ref 3.8–10.5)

## 2018-01-27 PROCEDURE — 99233 SBSQ HOSP IP/OBS HIGH 50: CPT

## 2018-01-27 RX ORDER — MAGNESIUM SULFATE 500 MG/ML
2 VIAL (ML) INJECTION ONCE
Qty: 0 | Refills: 0 | Status: COMPLETED | OUTPATIENT
Start: 2018-01-27 | End: 2018-01-27

## 2018-01-27 RX ORDER — SODIUM CHLORIDE 9 MG/ML
1000 INJECTION INTRAMUSCULAR; INTRAVENOUS; SUBCUTANEOUS
Qty: 0 | Refills: 0 | Status: DISCONTINUED | OUTPATIENT
Start: 2018-01-27 | End: 2018-01-30

## 2018-01-27 RX ORDER — POTASSIUM PHOSPHATE, MONOBASIC POTASSIUM PHOSPHATE, DIBASIC 236; 224 MG/ML; MG/ML
15 INJECTION, SOLUTION INTRAVENOUS ONCE
Qty: 0 | Refills: 0 | Status: COMPLETED | OUTPATIENT
Start: 2018-01-27 | End: 2018-01-27

## 2018-01-27 RX ADMIN — Medication 100 MILLIGRAM(S): at 18:22

## 2018-01-27 RX ADMIN — Medication 1000 UNIT(S): at 12:30

## 2018-01-27 RX ADMIN — PIPERACILLIN AND TAZOBACTAM 200 GRAM(S): 4; .5 INJECTION, POWDER, LYOPHILIZED, FOR SOLUTION INTRAVENOUS at 18:22

## 2018-01-27 RX ADMIN — Medication 1 DROP(S): at 22:58

## 2018-01-27 RX ADMIN — TACROLIMUS 1 MILLIGRAM(S): 5 CAPSULE ORAL at 10:03

## 2018-01-27 RX ADMIN — Medication 100 MILLIGRAM(S): at 12:30

## 2018-01-27 RX ADMIN — Medication 50 MILLIGRAM(S): at 09:44

## 2018-01-27 RX ADMIN — POTASSIUM PHOSPHATE, MONOBASIC POTASSIUM PHOSPHATE, DIBASIC 62.5 MILLIMOLE(S): 236; 224 INJECTION, SOLUTION INTRAVENOUS at 19:40

## 2018-01-27 RX ADMIN — DABIGATRAN ETEXILATE MESYLATE 150 MILLIGRAM(S): 150 CAPSULE ORAL at 22:56

## 2018-01-27 RX ADMIN — ATORVASTATIN CALCIUM 10 MILLIGRAM(S): 80 TABLET, FILM COATED ORAL at 22:58

## 2018-01-27 RX ADMIN — DABIGATRAN ETEXILATE MESYLATE 150 MILLIGRAM(S): 150 CAPSULE ORAL at 09:43

## 2018-01-27 RX ADMIN — TACROLIMUS 1 MILLIGRAM(S): 5 CAPSULE ORAL at 22:56

## 2018-01-27 RX ADMIN — Medication 50 GRAM(S): at 13:25

## 2018-01-27 RX ADMIN — PIPERACILLIN AND TAZOBACTAM 200 GRAM(S): 4; .5 INJECTION, POWDER, LYOPHILIZED, FOR SOLUTION INTRAVENOUS at 12:30

## 2018-01-27 RX ADMIN — MYCOPHENOLATE MOFETIL 750 MILLIGRAM(S): 250 CAPSULE ORAL at 10:03

## 2018-01-27 RX ADMIN — PIPERACILLIN AND TAZOBACTAM 200 GRAM(S): 4; .5 INJECTION, POWDER, LYOPHILIZED, FOR SOLUTION INTRAVENOUS at 01:00

## 2018-01-27 RX ADMIN — VALSARTAN 320 MILLIGRAM(S): 80 TABLET ORAL at 09:45

## 2018-01-27 RX ADMIN — Medication 40 MILLIGRAM(S): at 09:44

## 2018-01-27 RX ADMIN — PIPERACILLIN AND TAZOBACTAM 200 GRAM(S): 4; .5 INJECTION, POWDER, LYOPHILIZED, FOR SOLUTION INTRAVENOUS at 06:45

## 2018-01-27 RX ADMIN — FAMOTIDINE 20 MILLIGRAM(S): 10 INJECTION INTRAVENOUS at 12:30

## 2018-01-27 RX ADMIN — Medication 300 MILLIGRAM(S): at 09:44

## 2018-01-27 RX ADMIN — MYCOPHENOLATE MOFETIL 750 MILLIGRAM(S): 250 CAPSULE ORAL at 22:57

## 2018-01-27 RX ADMIN — Medication 100 MILLIGRAM(S): at 09:44

## 2018-01-27 RX ADMIN — CLOPIDOGREL BISULFATE 75 MILLIGRAM(S): 75 TABLET, FILM COATED ORAL at 12:30

## 2018-01-27 NOTE — DIETITIAN INITIAL EVALUATION ADULT. - ENERGY NEEDS
IBW used as pt is currently greater than 120% ideal body weight  Slightly increased needs secondary to current medical state  Fluids per team secondary to history of CHF  Energy: 1610-1890kcal (23-27cal/kg IBW)

## 2018-01-27 NOTE — DIETITIAN INITIAL EVALUATION ADULT. - PROBLEM SELECTOR PLAN 10
P: Patient adequately PPX for VTE  C: FULL CODE  D: Admit to [facility] under Dr. Jason for management of infection

## 2018-01-27 NOTE — DIETITIAN INITIAL EVALUATION ADULT. - PROBLEM SELECTOR PLAN 1
Patient met SIRS criteria on admission with elevated Temp, Leukocytosis, Tachycardia. Severe sepsis because of elevation of Cr showing end organ dysfunction.   -S/P 2L NS Bolus properly fluid resucitated  -Start patient on Zosyn 3.375 q6h for treatment of presumed complicated UTI  -Patient at increased risk due to immunosuppression secondary to kidney transplant based medications.

## 2018-01-27 NOTE — PROGRESS NOTE ADULT - SUBJECTIVE AND OBJECTIVE BOX
72M s/p renal transplant consulted for elevated Cr and Fever.    Has some incontinence but is able to empty bladder.  PVRs low.    No Urologic intervention needed. Elevated Cr likely due to renal etiology.    Connelly if PVRs are high

## 2018-01-27 NOTE — PROGRESS NOTE ADULT - SUBJECTIVE AND OBJECTIVE BOX
SUBJECTIVE / INTERVAL HPI: No major events overnight. Patient seen and examined at bedside.     VITAL SIGNS:  Vital Signs Last 24 Hrs  T(C): 36.7 (2018 05:31), Max: 38.6 (2018 11:32)  T(F): 98.1 (2018 05:31), Max: 101.5 (2018 11:32)  HR: 90 (2018 05:31) (54 - 121)  BP: 122/73 (2018 05:31) (92/62 - 122/73)  BP(mean): --  RR: 18 (2018 05:31) (16 - 18)  SpO2: 97% (2018 05:31) (93% - 100%)    PHYSICAL EXAM:    General: A&Ox3; NAD; slightly diaphoretic  Head: NC/AT; PERRL; EOMI; anicteric sclera  Neck: Supple; no JVD  Respiratory: CTA B/L; no wheezes/crackles/rales auscultated w/ good air movement  Cardiovascular: Irregularly Rate and Rhythm, S1/S2, no r/m/g  Gastrointestinal: Soft; NTND w/out rebound tenderness or guarding; bowel sounds normal; obese appearing  Extremities: WWP; no edema or cyanosis; radial/pedal pulses palpable  Neurological:  CNII-XII grossly intact; no obvious focal deficits      MEDICATIONS:  MEDICATIONS  (STANDING):  atorvastatin 10 milliGRAM(s) Oral at bedtime  brimonidine 0.2% Ophthalmic Solution 1 Drop(s) Both EYES daily  cholecalciferol 1000 Unit(s) Oral daily  clopidogrel Tablet 75 milliGRAM(s) Oral daily  dabigatran 150 milliGRAM(s) Oral every 12 hours  dextrose 5%. 1000 milliLiter(s) (50 mL/Hr) IV Continuous <Continuous>  dextrose 50% Injectable 12.5 Gram(s) IV Push once  dextrose 50% Injectable 25 Gram(s) IV Push once  dextrose 50% Injectable 25 Gram(s) IV Push once  diltiazem    milliGRAM(s) Oral daily  docusate sodium 100 milliGRAM(s) Oral two times a day  famotidine    Tablet 20 milliGRAM(s) Oral daily  furosemide    Tablet 40 milliGRAM(s) Oral daily  insulin lispro (HumaLOG) corrective regimen sliding scale   SubCutaneous Before meals and at bedtime  metoprolol succinate ER 50 milliGRAM(s) Oral daily  mycophenolate mofetil 750 milliGRAM(s) Oral every 12 hours  piperacillin/tazobactam IVPB. 3.375 Gram(s) IV Intermittent every 6 hours  tacrolimus 1 milliGRAM(s) Oral every 12 hours  thiamine 100 milliGRAM(s) Oral daily  timolol 0.5% Solution 1 Drop(s) Both EYES at bedtime  valsartan 320 milliGRAM(s) Oral daily    MEDICATIONS  (PRN):  dextrose Gel 1 Dose(s) Oral once PRN Blood Glucose LESS THAN 70 milliGRAM(s)/deciliter  glucagon  Injectable 1 milliGRAM(s) IntraMuscular once PRN Glucose LESS THAN 70 milligrams/deciliter      ALLERGIES:  Allergies    No Known Allergies    Intolerances        LABS:                        13.5   14.5  )-----------( 126      ( 2018 11:53 )             39.2         128<L>  |  91<L>  |  28<H>  ----------------------------<  122<H>  3.6   |  22  |  1.49<H>    Ca    8.2<L>      2018 18:10    TPro  8.4<H>  /  Alb  4.5  /  TBili  1.2  /  DBili  x   /  AST  18  /  ALT  21  /  AlkPhos  53      PT/INR - ( 2018 11:53 )   PT: 18.3 sec;   INR: 1.63          PTT - ( 2018 11:53 )  PTT:58.6 sec  Urinalysis Basic - ( 2018 11:41 )    Color: Yellow / Appearance: Clear / S.010 / pH: x  Gluc: x / Ketone: NEGATIVE  / Bili: Negative / Urobili: 0.2 E.U./dL   Blood: x / Protein: 30 mg/dL / Nitrite: NEGATIVE   Leuk Esterase: Trace / RBC: < 5 /HPF / WBC 5-10 /HPF   Sq Epi: x / Non Sq Epi: 5-10 /HPF / Bacteria: Present /HPF      CAPILLARY BLOOD GLUCOSE          RADIOLOGY & ADDITIONAL TESTS: Reviewed. SUBJECTIVE / INTERVAL HPI: No major events overnight. Patient seen and examined at bedside. No complaints this morning.    VITAL SIGNS:  Vital Signs Last 24 Hrs  T(C): 36.7 (2018 05:31), Max: 38.6 (2018 11:32)  T(F): 98.1 (2018 05:31), Max: 101.5 (2018 11:32)  HR: 90 (2018 05:31) (54 - 121)  BP: 122/73 (2018 05:31) (92/62 - 122/73)  BP(mean): --  RR: 18 (2018 05:31) (16 - 18)  SpO2: 97% (2018 05:31) (93% - 100%)    PHYSICAL EXAM:    General: A&Ox3; NAD; slightly diaphoretic  Head: NC/AT; PERRL; EOMI; anicteric sclera  Neck: Supple; no JVD  Respiratory: CTA B/L; no wheezes/crackles/rales auscultated w/ good air movement  Cardiovascular: Irregularly Rate and Rhythm, S1/S2, no r/m/g  Gastrointestinal: Soft; NTND w/out rebound tenderness or guarding; bowel sounds normal; obese appearing  Extremities: WWP; no edema or cyanosis; radial/pedal pulses palpable  Neurological:  CNII-XII grossly intact; no obvious focal deficits      MEDICATIONS:  MEDICATIONS  (STANDING):  atorvastatin 10 milliGRAM(s) Oral at bedtime  brimonidine 0.2% Ophthalmic Solution 1 Drop(s) Both EYES daily  cholecalciferol 1000 Unit(s) Oral daily  clopidogrel Tablet 75 milliGRAM(s) Oral daily  dabigatran 150 milliGRAM(s) Oral every 12 hours  dextrose 5%. 1000 milliLiter(s) (50 mL/Hr) IV Continuous <Continuous>  dextrose 50% Injectable 12.5 Gram(s) IV Push once  dextrose 50% Injectable 25 Gram(s) IV Push once  dextrose 50% Injectable 25 Gram(s) IV Push once  diltiazem    milliGRAM(s) Oral daily  docusate sodium 100 milliGRAM(s) Oral two times a day  famotidine    Tablet 20 milliGRAM(s) Oral daily  furosemide    Tablet 40 milliGRAM(s) Oral daily  insulin lispro (HumaLOG) corrective regimen sliding scale   SubCutaneous Before meals and at bedtime  metoprolol succinate ER 50 milliGRAM(s) Oral daily  mycophenolate mofetil 750 milliGRAM(s) Oral every 12 hours  piperacillin/tazobactam IVPB. 3.375 Gram(s) IV Intermittent every 6 hours  tacrolimus 1 milliGRAM(s) Oral every 12 hours  thiamine 100 milliGRAM(s) Oral daily  timolol 0.5% Solution 1 Drop(s) Both EYES at bedtime  valsartan 320 milliGRAM(s) Oral daily    MEDICATIONS  (PRN):  dextrose Gel 1 Dose(s) Oral once PRN Blood Glucose LESS THAN 70 milliGRAM(s)/deciliter  glucagon  Injectable 1 milliGRAM(s) IntraMuscular once PRN Glucose LESS THAN 70 milligrams/deciliter      ALLERGIES:  Allergies    No Known Allergies    Intolerances        LABS:                        13.5   14.5  )-----------( 126      ( 2018 11:53 )             39.2         128<L>  |  91<L>  |  28<H>  ----------------------------<  122<H>  3.6   |  22  |  1.49<H>    Ca    8.2<L>      2018 18:10    TPro  8.4<H>  /  Alb  4.5  /  TBili  1.2  /  DBili  x   /  AST  18  /  ALT  21  /  AlkPhos  53      PT/INR - ( 2018 11:53 )   PT: 18.3 sec;   INR: 1.63          PTT - ( 2018 11:53 )  PTT:58.6 sec  Urinalysis Basic - ( 2018 11:41 )    Color: Yellow / Appearance: Clear / S.010 / pH: x  Gluc: x / Ketone: NEGATIVE  / Bili: Negative / Urobili: 0.2 E.U./dL   Blood: x / Protein: 30 mg/dL / Nitrite: NEGATIVE   Leuk Esterase: Trace / RBC: < 5 /HPF / WBC 5-10 /HPF   Sq Epi: x / Non Sq Epi: 5-10 /HPF / Bacteria: Present /HPF      CAPILLARY BLOOD GLUCOSE          RADIOLOGY & ADDITIONAL TESTS: Reviewed.

## 2018-01-27 NOTE — PROGRESS NOTE ADULT - PROBLEM SELECTOR PLAN 3
Patient with hyponatremia on admission. Likely secondary to hypovoluemic hyponatremia now s/p fluid resuscitation  -Urine Na/Urine Osm  -Trend BMP Patient with hyponatremia on admission. Likely secondary to hypovoluemic hyponatremia now s/p fluid resuscitation  -Urine Na/Urine Osm  -Trend BMP  - Improving.

## 2018-01-27 NOTE — PROGRESS NOTE ADULT - PROBLEM SELECTOR PLAN 1
Patient met SIRS criteria on admission with elevated Temp, Leukocytosis, Tachycardia. Severe sepsis because of elevation of Cr showing end organ dysfunction.   -S/P 2L NS Bolus properly fluid resucitated  -Start patient on Zosyn 3.375 q6h for treatment of presumed complicated UTI  -Patient at increased risk due to immunosuppression secondary to kidney transplant based medications. Patient met SIRS criteria on admission with elevated Temp, Leukocytosis, Tachycardia. Severe sepsis because of elevation of Cr showing end organ dysfunction.   -S/P 2L NS Bolus properly fluid resucitated  -Continue on Zosyn 3.375 q6h for treatment of presumed complicated UTI  - GNRs in urine and blood. Speciation and sensitivity pending.  -Patient at increased risk due to immunosuppression secondary to kidney transplant based medications.

## 2018-01-27 NOTE — CHART NOTE - NSCHARTNOTEFT_GEN_A_CORE
Called to bedside by nurse on night of 1/26/18.  Patient stated that he took his home metformin.  Informed patient not to take his home medications unless given by nurse and that metformin is not given during inpatient hospitalization.  Patient refused finger sticks and insulin.  Stated that due to his frequent snacking, finger sticks "will not be accurate anyway."  Informed about and understands consequences and dangers of hyperglycemia, especially given his history of ESRD and renal transplant.

## 2018-01-27 NOTE — DIETITIAN INITIAL EVALUATION ADULT. - OTHER INFO
Pt admitted with difficulty urinating x3-4 days; severe sepsis.  Pt is sleeping at time of assessment; unarousable to verbal stimuli.  Nutrition history unable to be obtained.  Observed 100% intake of breakfast tray.  Pt with no recent complaints of GI distress or pain.  Skin: intact.

## 2018-01-27 NOTE — DIETITIAN INITIAL EVALUATION ADULT. - NS AS NUTRI INTERV MEALS SNACK
Change diet to DASH/TLC, Consistent CHO to best meet pt's needs.  Honor food preferences as feasible

## 2018-01-28 LAB
-  AMPICILLIN/SULBACTAM: SIGNIFICANT CHANGE UP
-  AMPICILLIN: SIGNIFICANT CHANGE UP
-  CEFAZOLIN: SIGNIFICANT CHANGE UP
-  CEFTRIAXONE: SIGNIFICANT CHANGE UP
-  CIPROFLOXACIN: SIGNIFICANT CHANGE UP
-  GENTAMICIN: SIGNIFICANT CHANGE UP
-  NITROFURANTOIN: SIGNIFICANT CHANGE UP
-  PIPERACILLIN/TAZOBACTAM: SIGNIFICANT CHANGE UP
-  TOBRAMYCIN: SIGNIFICANT CHANGE UP
-  TRIMETHOPRIM/SULFAMETHOXAZOLE: SIGNIFICANT CHANGE UP
ANION GAP SERPL CALC-SCNC: 15 MMOL/L — SIGNIFICANT CHANGE UP (ref 5–17)
BUN SERPL-MCNC: 19 MG/DL — SIGNIFICANT CHANGE UP (ref 7–23)
CALCIUM SERPL-MCNC: 8.4 MG/DL — SIGNIFICANT CHANGE UP (ref 8.4–10.5)
CHLORIDE SERPL-SCNC: 91 MMOL/L — LOW (ref 96–108)
CO2 SERPL-SCNC: 23 MMOL/L — SIGNIFICANT CHANGE UP (ref 22–31)
CREAT SERPL-MCNC: 1.25 MG/DL — SIGNIFICANT CHANGE UP (ref 0.5–1.3)
CULTURE RESULTS: SIGNIFICANT CHANGE UP
GLUCOSE SERPL-MCNC: 154 MG/DL — HIGH (ref 70–99)
HCT VFR BLD CALC: 33.8 % — LOW (ref 39–50)
HGB BLD-MCNC: 11.6 G/DL — LOW (ref 13–17)
MAGNESIUM SERPL-MCNC: 1.4 MG/DL — LOW (ref 1.6–2.6)
MCHC RBC-ENTMCNC: 31.4 PG — SIGNIFICANT CHANGE UP (ref 27–34)
MCHC RBC-ENTMCNC: 34.3 G/DL — SIGNIFICANT CHANGE UP (ref 32–36)
MCV RBC AUTO: 91.4 FL — SIGNIFICANT CHANGE UP (ref 80–100)
METHOD TYPE: SIGNIFICANT CHANGE UP
ORGANISM # SPEC MICROSCOPIC CNT: SIGNIFICANT CHANGE UP
ORGANISM # SPEC MICROSCOPIC CNT: SIGNIFICANT CHANGE UP
OSMOLALITY UR: 308 MOSMOL/KG — SIGNIFICANT CHANGE UP (ref 100–650)
PHOSPHATE SERPL-MCNC: 3.1 MG/DL — SIGNIFICANT CHANGE UP (ref 2.5–4.5)
PLATELET # BLD AUTO: 119 K/UL — LOW (ref 150–400)
POTASSIUM SERPL-MCNC: 3.4 MMOL/L — LOW (ref 3.5–5.3)
POTASSIUM SERPL-SCNC: 3.4 MMOL/L — LOW (ref 3.5–5.3)
RBC # BLD: 3.7 M/UL — LOW (ref 4.2–5.8)
RBC # FLD: 15.5 % — SIGNIFICANT CHANGE UP (ref 10.3–16.9)
SODIUM SERPL-SCNC: 129 MMOL/L — LOW (ref 135–145)
SODIUM UR-SCNC: 53 MMOL/L — SIGNIFICANT CHANGE UP
SPECIMEN SOURCE: SIGNIFICANT CHANGE UP
WBC # BLD: 5.5 K/UL — SIGNIFICANT CHANGE UP (ref 3.8–10.5)
WBC # FLD AUTO: 5.5 K/UL — SIGNIFICANT CHANGE UP (ref 3.8–10.5)

## 2018-01-28 PROCEDURE — 99233 SBSQ HOSP IP/OBS HIGH 50: CPT

## 2018-01-28 RX ORDER — MAGNESIUM SULFATE 500 MG/ML
2 VIAL (ML) INJECTION ONCE
Qty: 0 | Refills: 0 | Status: COMPLETED | OUTPATIENT
Start: 2018-01-28 | End: 2018-01-28

## 2018-01-28 RX ORDER — POTASSIUM CHLORIDE 20 MEQ
40 PACKET (EA) ORAL ONCE
Qty: 0 | Refills: 0 | Status: COMPLETED | OUTPATIENT
Start: 2018-01-28 | End: 2018-01-28

## 2018-01-28 RX ADMIN — ATORVASTATIN CALCIUM 10 MILLIGRAM(S): 80 TABLET, FILM COATED ORAL at 22:38

## 2018-01-28 RX ADMIN — FAMOTIDINE 20 MILLIGRAM(S): 10 INJECTION INTRAVENOUS at 12:48

## 2018-01-28 RX ADMIN — TACROLIMUS 1 MILLIGRAM(S): 5 CAPSULE ORAL at 09:48

## 2018-01-28 RX ADMIN — PIPERACILLIN AND TAZOBACTAM 200 GRAM(S): 4; .5 INJECTION, POWDER, LYOPHILIZED, FOR SOLUTION INTRAVENOUS at 12:48

## 2018-01-28 RX ADMIN — Medication 50 MILLIGRAM(S): at 05:46

## 2018-01-28 RX ADMIN — MYCOPHENOLATE MOFETIL 750 MILLIGRAM(S): 250 CAPSULE ORAL at 22:38

## 2018-01-28 RX ADMIN — PIPERACILLIN AND TAZOBACTAM 200 GRAM(S): 4; .5 INJECTION, POWDER, LYOPHILIZED, FOR SOLUTION INTRAVENOUS at 05:45

## 2018-01-28 RX ADMIN — SODIUM CHLORIDE 100 MILLILITER(S): 9 INJECTION INTRAMUSCULAR; INTRAVENOUS; SUBCUTANEOUS at 05:46

## 2018-01-28 RX ADMIN — Medication 40 MILLIEQUIVALENT(S): at 12:56

## 2018-01-28 RX ADMIN — TACROLIMUS 1 MILLIGRAM(S): 5 CAPSULE ORAL at 22:39

## 2018-01-28 RX ADMIN — VALSARTAN 320 MILLIGRAM(S): 80 TABLET ORAL at 05:46

## 2018-01-28 RX ADMIN — Medication 1000 UNIT(S): at 12:56

## 2018-01-28 RX ADMIN — MYCOPHENOLATE MOFETIL 750 MILLIGRAM(S): 250 CAPSULE ORAL at 09:48

## 2018-01-28 RX ADMIN — Medication 300 MILLIGRAM(S): at 09:48

## 2018-01-28 RX ADMIN — CLOPIDOGREL BISULFATE 75 MILLIGRAM(S): 75 TABLET, FILM COATED ORAL at 12:48

## 2018-01-28 RX ADMIN — SODIUM CHLORIDE 100 MILLILITER(S): 9 INJECTION INTRAMUSCULAR; INTRAVENOUS; SUBCUTANEOUS at 00:19

## 2018-01-28 RX ADMIN — Medication 40 MILLIGRAM(S): at 05:46

## 2018-01-28 RX ADMIN — Medication 50 GRAM(S): at 09:48

## 2018-01-28 RX ADMIN — Medication 100 MILLIGRAM(S): at 05:45

## 2018-01-28 RX ADMIN — DABIGATRAN ETEXILATE MESYLATE 150 MILLIGRAM(S): 150 CAPSULE ORAL at 09:59

## 2018-01-28 RX ADMIN — PIPERACILLIN AND TAZOBACTAM 200 GRAM(S): 4; .5 INJECTION, POWDER, LYOPHILIZED, FOR SOLUTION INTRAVENOUS at 00:18

## 2018-01-28 RX ADMIN — PIPERACILLIN AND TAZOBACTAM 200 GRAM(S): 4; .5 INJECTION, POWDER, LYOPHILIZED, FOR SOLUTION INTRAVENOUS at 18:37

## 2018-01-28 RX ADMIN — DABIGATRAN ETEXILATE MESYLATE 150 MILLIGRAM(S): 150 CAPSULE ORAL at 22:39

## 2018-01-28 RX ADMIN — Medication 100 MILLIGRAM(S): at 12:48

## 2018-01-28 NOTE — PROGRESS NOTE ADULT - SUBJECTIVE AND OBJECTIVE BOX
CC: SEPSIS    INTERVAL HISTORY:    * LORENA improved. * Fever resolved. * BP controlled.     ROS: No chest pain. No shortness of breath. No nausea.    PAST MEDICAL & SURGICAL HISTORY:  Congestive heart failure  Aortic stenosis  Coronary artery disease  AV fistula: Left forearm  Hyperlipidemia: Hyperlipidemia  Essential hypertension: HTN (hypertension)  Complication of transplanted kidney: s/p right sided renal transplant  Atrial fibrillation: Atrial fibrillation  Type 2 diabetes mellitus: Diabetes  AVF (arteriovenous fistula)  S/P primary angioplasty with coronary stent  History of kidney transplant: right    PHYSICAL EXAM:  T(C): 36.6 (2018 16:17), Max: 37.3 (2018 08:30)  HR: 75 (2018 16:17)  BP: 117/74 (2018 16:17) (95/59 - 138/71)  RR: 18 (2018 16:17)  SpO2: 96% (2018 16:17)      2018 07:01  -  2018 07:00  --------------------------------------------------------  IN:  Total IN: 0 mL    OUT:    Voided: 3000 mL  Total OUT: 3000 mL    Total NET: -3000 mL      2018 07:01  -  2018 21:19  --------------------------------------------------------  IN:    sodium chloride 0.9%.: 200 mL  Total IN: 200 mL    OUT:    Voided: 2525 mL  Total OUT: 2525 mL    Total NET: -2325 mL        Weight 86.2 (2018 11:22)    Appearance: alert, pleasant, INAD.  ENT: oral mucosa moist, no pallor/cyanosis.  Neck: no JVD visible.  Cardiac: no rubs. no murmurs. Extremities: NO EDEMA.  Skin: no rashes.  Extremities (digits): no clubbing or cyanosis.  Respratory effort: no access muscle use. Lungs: CLEAR TO AUSCULTATION.  Abdomen: soft. nontender. no masses.  Psych affect: not depressed. Orientation: person, place, situation.     MEDICATIONS  (STANDING):  atorvastatin 10 milliGRAM(s) Oral at bedtime  brimonidine 0.2% Ophthalmic Solution 1 Drop(s) Both EYES daily  cholecalciferol 1000 Unit(s) Oral daily  clopidogrel Tablet 75 milliGRAM(s) Oral daily  dabigatran 150 milliGRAM(s) Oral every 12 hours  dextrose 5%. 1000 milliLiter(s) (50 mL/Hr) IV Continuous <Continuous>  dextrose 50% Injectable 12.5 Gram(s) IV Push once  dextrose 50% Injectable 25 Gram(s) IV Push once  dextrose 50% Injectable 25 Gram(s) IV Push once  diltiazem    milliGRAM(s) Oral daily  docusate sodium 100 milliGRAM(s) Oral two times a day  famotidine    Tablet 20 milliGRAM(s) Oral daily  furosemide    Tablet 40 milliGRAM(s) Oral daily  insulin lispro (HumaLOG) corrective regimen sliding scale   SubCutaneous Before meals and at bedtime  metoprolol succinate ER 50 milliGRAM(s) Oral daily  mycophenolate mofetil 750 milliGRAM(s) Oral every 12 hours  piperacillin/tazobactam IVPB. 3.375 Gram(s) IV Intermittent every 6 hours  sodium chloride 0.9%. 1000 milliLiter(s) (100 mL/Hr) IV Continuous <Continuous>  tacrolimus 1 milliGRAM(s) Oral every 12 hours  thiamine 100 milliGRAM(s) Oral daily  timolol 0.5% Solution 1 Drop(s) Both EYES at bedtime  valsartan 320 milliGRAM(s) Oral daily    MEDICATIONS  (PRN):  dextrose Gel 1 Dose(s) Oral once PRN Blood Glucose LESS THAN 70 milliGRAM(s)/deciliter  glucagon  Injectable 1 milliGRAM(s) IntraMuscular once PRN Glucose LESS THAN 70 milligrams/deciliter    DATA:  129<L>  |  91<L>  |  19     ----------------------------<  154<H>  Ca:8.4   (2018 06:48)  3.4<L>   |  23     |  1.25       eGFR if Non : 58 <L>  eGFR if : 67    TPro  8.4<H>  /  Alb  4.5    /  TBili  1.2    /  DBili  x      /  AST  18     /  ALT  21     /  AlkPhos  53     2018 11:53    SCr 1.25 [2018 06:48]  SCr 1.55 [2018 08:19]  SCr 1.49 [2018 18:10]  SCr 1.59 [2018 11:53]                          11.6<L>  5.5   )-----------( 119<L>    ( 2018 06:48 )             33.8<L>    Phos:3.1 mg/dL M.4 mg/dL<L> PTH:-- Uric acid:-- Serum Osm:--  Ferritin:-- Iron:-- TIBC:-- Tsat:--  B12:-- TSH:-- (2018 06:48)    Urinalysis Basic - ( 2018 11:41 )  Color: Yellow / Appearance: Clear / S.010 / pH: x  Gluc: x / Ketone: NEGATIVE  / Bili: Negative / Urobili: 0.2 E.U./dL   Blood: x / Protein: 30 mg/dL<!!> / Nitrite: NEGATIVE   Leuk Esterase: Trace<!!> / RBC: < 5 /HPF / WBC 5-10 /HPF<!!>   Sq Epi: x / Non Sq Epi: 5-10 /HPF<!!> / Bacteria: Present /HPF<!!>      UProt:-- UCr:-- P/C Ratio:-- 24 hour Prot:-- UVol:-- CrCl:--  Sandra:53 mmol/L UOsm:308 mosmol/kg UVol:-- UCl:-- UK:-- (2018 05:46)      Xray Chest 2 Views PA/Lat:  (2018 12:44)

## 2018-01-29 LAB
-  AMPICILLIN/SULBACTAM: SIGNIFICANT CHANGE UP
-  AMPICILLIN: SIGNIFICANT CHANGE UP
-  CEFAZOLIN: SIGNIFICANT CHANGE UP
-  CEFTRIAXONE: SIGNIFICANT CHANGE UP
-  CIPROFLOXACIN: SIGNIFICANT CHANGE UP
-  GENTAMICIN: SIGNIFICANT CHANGE UP
-  PIPERACILLIN/TAZOBACTAM: SIGNIFICANT CHANGE UP
-  TOBRAMYCIN: SIGNIFICANT CHANGE UP
-  TRIMETHOPRIM/SULFAMETHOXAZOLE: SIGNIFICANT CHANGE UP
CORTIS AM PEAK SERPL-MCNC: 19.3 UG/DL — SIGNIFICANT CHANGE UP (ref 3.9–37.5)
CULTURE RESULTS: SIGNIFICANT CHANGE UP
METHOD TYPE: SIGNIFICANT CHANGE UP
ORGANISM # SPEC MICROSCOPIC CNT: SIGNIFICANT CHANGE UP
SPECIMEN SOURCE: SIGNIFICANT CHANGE UP
TSH SERPL-MCNC: 1.72 UIU/ML — SIGNIFICANT CHANGE UP (ref 0.35–4.94)

## 2018-01-29 PROCEDURE — 99233 SBSQ HOSP IP/OBS HIGH 50: CPT

## 2018-01-29 RX ORDER — POTASSIUM CHLORIDE 20 MEQ
20 PACKET (EA) ORAL
Qty: 0 | Refills: 0 | Status: COMPLETED | OUTPATIENT
Start: 2018-01-29 | End: 2018-01-29

## 2018-01-29 RX ORDER — MAGNESIUM SULFATE 500 MG/ML
1 VIAL (ML) INJECTION ONCE
Qty: 0 | Refills: 0 | Status: COMPLETED | OUTPATIENT
Start: 2018-01-29 | End: 2018-01-29

## 2018-01-29 RX ADMIN — Medication 20 MILLIEQUIVALENT(S): at 12:06

## 2018-01-29 RX ADMIN — TACROLIMUS 1 MILLIGRAM(S): 5 CAPSULE ORAL at 08:44

## 2018-01-29 RX ADMIN — Medication 100 MILLIGRAM(S): at 07:04

## 2018-01-29 RX ADMIN — MYCOPHENOLATE MOFETIL 750 MILLIGRAM(S): 250 CAPSULE ORAL at 22:49

## 2018-01-29 RX ADMIN — Medication 1000 UNIT(S): at 12:07

## 2018-01-29 RX ADMIN — Medication 20 MILLIEQUIVALENT(S): at 10:35

## 2018-01-29 RX ADMIN — DABIGATRAN ETEXILATE MESYLATE 150 MILLIGRAM(S): 150 CAPSULE ORAL at 22:45

## 2018-01-29 RX ADMIN — BRIMONIDINE TARTRATE 1 DROP(S): 2 SOLUTION/ DROPS OPHTHALMIC at 12:07

## 2018-01-29 RX ADMIN — Medication 100 GRAM(S): at 09:02

## 2018-01-29 RX ADMIN — PIPERACILLIN AND TAZOBACTAM 200 GRAM(S): 4; .5 INJECTION, POWDER, LYOPHILIZED, FOR SOLUTION INTRAVENOUS at 00:19

## 2018-01-29 RX ADMIN — Medication 300 MILLIGRAM(S): at 08:44

## 2018-01-29 RX ADMIN — DABIGATRAN ETEXILATE MESYLATE 150 MILLIGRAM(S): 150 CAPSULE ORAL at 08:44

## 2018-01-29 RX ADMIN — PIPERACILLIN AND TAZOBACTAM 200 GRAM(S): 4; .5 INJECTION, POWDER, LYOPHILIZED, FOR SOLUTION INTRAVENOUS at 07:05

## 2018-01-29 RX ADMIN — VALSARTAN 320 MILLIGRAM(S): 80 TABLET ORAL at 07:05

## 2018-01-29 RX ADMIN — Medication 100 MILLIGRAM(S): at 18:04

## 2018-01-29 RX ADMIN — TACROLIMUS 1 MILLIGRAM(S): 5 CAPSULE ORAL at 22:45

## 2018-01-29 RX ADMIN — ATORVASTATIN CALCIUM 10 MILLIGRAM(S): 80 TABLET, FILM COATED ORAL at 22:49

## 2018-01-29 RX ADMIN — Medication 40 MILLIGRAM(S): at 07:05

## 2018-01-29 RX ADMIN — PIPERACILLIN AND TAZOBACTAM 200 GRAM(S): 4; .5 INJECTION, POWDER, LYOPHILIZED, FOR SOLUTION INTRAVENOUS at 18:05

## 2018-01-29 RX ADMIN — Medication 50 MILLIGRAM(S): at 07:05

## 2018-01-29 RX ADMIN — MYCOPHENOLATE MOFETIL 750 MILLIGRAM(S): 250 CAPSULE ORAL at 08:44

## 2018-01-29 RX ADMIN — Medication 100 MILLIGRAM(S): at 12:06

## 2018-01-29 RX ADMIN — FAMOTIDINE 20 MILLIGRAM(S): 10 INJECTION INTRAVENOUS at 12:06

## 2018-01-29 RX ADMIN — Medication 20 MILLIEQUIVALENT(S): at 09:02

## 2018-01-29 RX ADMIN — Medication 1 DROP(S): at 23:19

## 2018-01-29 RX ADMIN — PIPERACILLIN AND TAZOBACTAM 200 GRAM(S): 4; .5 INJECTION, POWDER, LYOPHILIZED, FOR SOLUTION INTRAVENOUS at 12:07

## 2018-01-29 RX ADMIN — CLOPIDOGREL BISULFATE 75 MILLIGRAM(S): 75 TABLET, FILM COATED ORAL at 12:06

## 2018-01-29 NOTE — PROGRESS NOTE ADULT - PROBLEM SELECTOR PLAN 5
Patient with chronic AFib.   -Continue Metoprolol 50mg ER Qd  -Continue Diltiazem CD 300mg Qd  -Continue Pradaxa 150mg Q12h
Patient with chronic AFib.   -Continue Metoprolol 50mg ER Qd  -Continue Diltiazem CD 300mg Qd  -Continue Pradaxa 150mg Q12h

## 2018-01-29 NOTE — PROGRESS NOTE ADULT - ASSESSMENT
72M with Kidney transplant 2011, HFpEF, AS, Afib on pradaxa, CAD s/p WALLY to dLCx/pLCx (1/2017), HTN, HLD, BPH, DM and active smoker/drinker who presents with difficulty urinating for 3-4 days.
72M with Kidney transplant 2011, HFpEF, AS, Afib on pradaxa, CAD s/p WALLY to dLCx/pLCx (1/2017), HTN, HLD, BPH, DM and active smoker/drinker who presents with difficulty urinating for 3-4 days.
A/P: 72M with Kidney transplant 2011, Afib, CAD s/p PCI, HTN, HLD, BPH, DM and active smoker/drinker who presents with difficulty urinating /  fevers -> found to have Sepsis 2/2 UTI; Cardiology consulted for EKG changes and mild elevation in trops  -Patient has AFib w/ RBBB unchanged from prior EKGs; However there is limb lead reversal in the current EKG on this admission; Please repeat 12-lead w/ proper lead placement  -Elevated trops likely 2/2 demand ischemia in the setting of Sepsis; No active CP  -c/w Plavix and Lipitor  -AF - c/w current Rate control: Cardizem 300 daily  -c/w current A/C - Pradaxa 150 BID
Transplant pyelonephritis. LORENA improved. HTN controlled.     Plan:    1. Continue abx.  2. Follow SCr.  3. Possible d/c on oral antibiotics tomorrow.  4. Continue BP meds.    Please call with any questions.    Reza Jason MD, FACP, FASN | kidney.[facility]  Nephrology, Hypertension, and Internal Medicine  Mobile: [phone] (Daytime Hours Only)  Office/Answering Service: [phone]  Asst. Prof. of Medicine, [facility]  [facility] - Nephrology at [address]  [address], [address], [place], [place]

## 2018-01-29 NOTE — PROGRESS NOTE ADULT - PROBLEM SELECTOR PLAN 9
F: No IVF as patient adequately resuscitated at this time, will continue to monitor. Fluids sparingly as patient has history of HFpEF  E: Replete PRN  N: DASH/TLC, consistent Carbs, Renal Transplant
F: No IVF as patient adequately resuscitated at this time, will continue to monitor. Fluids sparingly as patient has history of HFpEF  E: Replete PRN  N: DASH/TLC, consistent Carbs, Renal Transplant

## 2018-01-29 NOTE — PROGRESS NOTE ADULT - PROBLEM SELECTOR PLAN 2
Patient with Kidney transplant. As per imaging transplanted kidney shows no hydronephrosis. There is decreased filling/emptying but kidney shows good blood flow. CT concerning for nephritis vs. rejection  -Tacrolimus 1mg BID  -Mycophenolic Acid 720mg ER
Patient with Kidney transplant. As per imaging transplanted kidney shows no hydronephrosis. There is decreased filling/emptying but kidney shows good blood flow. CT concerning for nephritis vs. rejection  -Tacrolimus 1mg BID  -Mycophenolic Acid 720mg ER

## 2018-01-29 NOTE — PROGRESS NOTE ADULT - PROBLEM SELECTOR PLAN 3
Patient with hyponatremia on admission. Likely secondary to hypovoluemic hyponatremia now s/p fluid resuscitation  -Urine Na/Urine Osm  -Trend BMP  - Improving.

## 2018-01-29 NOTE — PROGRESS NOTE ADULT - PROBLEM SELECTOR PLAN 1
Patient met SIRS criteria on admission with elevated Temp, Leukocytosis, Tachycardia. Severe sepsis because of elevation of Cr showing end organ dysfunction.   -S/P 2L NS Bolus properly fluid resucitated  -Continue on Zosyn 3.375 q6h for treatment of presumed complicated UTI  -Culture positive for Enterococcus species and Enterobacter cloacae  -Patient at increased risk due to immunosuppression secondary to kidney transplant based medications.

## 2018-01-29 NOTE — PROGRESS NOTE ADULT - PROBLEM SELECTOR PLAN 10
P: Patient adequately PPX for VTE  C: FULL CODE  D: Continue on RMF
P: Patient adequately PPX for VTE  C: FULL CODE  D: Admit to [facility] under Dr. Jason for management of infection

## 2018-01-29 NOTE — PROGRESS NOTE ADULT - PROBLEM SELECTOR PLAN 4
Patient with HFpEF.  -Continue Metoprolol 50mg ER QD  -Continue Furosemide 40mg Qd  -ACE CI due to renal dysfunction
Patient with HFpEF.  -Continue Metoprolol 50mg ER QD  -Continue Furosemide 40mg Qd  -ACE CI due to renal dysfunction

## 2018-01-29 NOTE — PROGRESS NOTE ADULT - SUBJECTIVE AND OBJECTIVE BOX
O/N Events: REGINO  Subjective/ROS: Patient has no complaints Denies HA, CP, SOB, n/v, changes in bowel/urinary habits.  12pt ROS otherwise negative.    VITALS  Vital Signs Last 24 Hrs  T(C): 37.2 (29 Jan 2018 08:40), Max: 37.2 (29 Jan 2018 05:06)  T(F): 98.9 (29 Jan 2018 08:40), Max: 98.9 (29 Jan 2018 05:06)  HR: 81 (29 Jan 2018 08:40) (75 - 85)  BP: 128/81 (29 Jan 2018 08:40) (117/74 - 137/83)  BP(mean): --  RR: 15 (29 Jan 2018 08:40) (15 - 18)  SpO2: 95% (29 Jan 2018 08:40) (92% - 96%)    CAPILLARY BLOOD GLUCOSE    PHYSICAL EXAM  General: A&Ox3; NAD  Head: NC/AT; MMM; PERRL; EOMI;  Neck: Supple; no JVD  Respiratory: CTA B/L; no wheezes/crackles   Cardiovascular: Irregular rhythm/rate; S1/S2   Gastrointestinal: Soft; NTND; normoactive BS  Extremities: WWP; no edema/cyanosis  Neurological:  CNII-XII grossly intact; no obvious focal deficits    MEDICATIONS  (STANDING):  atorvastatin 10 milliGRAM(s) Oral at bedtime  brimonidine 0.2% Ophthalmic Solution 1 Drop(s) Both EYES daily  cholecalciferol 1000 Unit(s) Oral daily  clopidogrel Tablet 75 milliGRAM(s) Oral daily  dabigatran 150 milliGRAM(s) Oral every 12 hours  dextrose 5%. 1000 milliLiter(s) (50 mL/Hr) IV Continuous <Continuous>  dextrose 50% Injectable 12.5 Gram(s) IV Push once  dextrose 50% Injectable 25 Gram(s) IV Push once  dextrose 50% Injectable 25 Gram(s) IV Push once  diltiazem    milliGRAM(s) Oral daily  docusate sodium 100 milliGRAM(s) Oral two times a day  famotidine    Tablet 20 milliGRAM(s) Oral daily  furosemide    Tablet 40 milliGRAM(s) Oral daily  insulin lispro (HumaLOG) corrective regimen sliding scale   SubCutaneous Before meals and at bedtime  metoprolol succinate ER 50 milliGRAM(s) Oral daily  mycophenolate mofetil 750 milliGRAM(s) Oral every 12 hours  piperacillin/tazobactam IVPB. 3.375 Gram(s) IV Intermittent every 6 hours  potassium chloride    Tablet ER 20 milliEquivalent(s) Oral every 2 hours  sodium chloride 0.9%. 1000 milliLiter(s) (100 mL/Hr) IV Continuous <Continuous>  tacrolimus 1 milliGRAM(s) Oral every 12 hours  thiamine 100 milliGRAM(s) Oral daily  timolol 0.5% Solution 1 Drop(s) Both EYES at bedtime  valsartan 320 milliGRAM(s) Oral daily    MEDICATIONS  (PRN):  dextrose Gel 1 Dose(s) Oral once PRN Blood Glucose LESS THAN 70 milliGRAM(s)/deciliter  glucagon  Injectable 1 milliGRAM(s) IntraMuscular once PRN Glucose LESS THAN 70 milligrams/deciliter      No Known Allergies      LABS                        11.6   5.5   )-----------( 119      ( 28 Jan 2018 06:48 )             33.8     01-28    129<L>  |  91<L>  |  19  ----------------------------<  154<H>  3.4<L>   |  23  |  1.25    Ca    8.4      28 Jan 2018 06:48  Phos  3.1     01-28  Mg     1.4     01-28        Culture - Urine (01.26.18 @ 15:26)    -  Ampicillin: R >16    -  Ampicillin/Sulbactam: R >16/8    -  Cefazolin: R >16    -  Ceftriaxone: S <=1    -  Ciprofloxacin: S <=1    -  Gentamicin: S <=4    -  Nitrofurantoin: S <=32    -  Piperacillin/Tazobactam: S <=16    -  Tobramycin: S <=4    -  Trimethoprim/Sulfamethoxazole: S <=2/38    Specimen Source: .Urine Clean Catch (Midstream)    Culture Results:   >100,000 CFU/ml Enterobacter cloacae    Organism Identification: Enterobacter cloacae    Organism: Enterobacter cloacae    Method Type: TSERING    Culture - Blood (01.26.18 @ 14:49)    Gram Stain:   Aerobic and Anaerobic Bottle: Gram Negative Rods  Result called to and read back byCindy Goldstein RN  01/27/2018 09:26:59    Specimen Source: .Blood Blood-Peripheral    Culture Results:   Growth in aerobic and anaerobic bottles: Gram Negative Rods  Identification and susceptibility to follow.    Culture - Blood (01.26.18 @ 14:49)    -  Trimethoprim/Sulfamethoxazole: S <=2/38    -  Enterococcus species: Detec    -  Enterobacter cloacae complex: Detec    -  Ampicillin: R >16    Gram Stain:   Aerobic and Anaerobic Bottle: Gram Negative Rods  Result called to and read back byCindy Goldstein RN  01/27/2018 09:28:33  ***Blood Panel PCR results on this specimen are available  approximately 3 hours after the Gram stain result.***  Gram stain, PCR, and/or culture results may not always  correspond due to difference in methodologies.  ************************************************************  This PCR assay was performed using [employer].  The following targets are tested for: Enterococcus,  vancomycin resistant enterococci, Listeria monocytogenes,  coagulase negative staphylococci, S. aureus,  methicillin resistant S. aureus, Streptococcus agalactiae  (Group B), S. pneumoniae, S. pyogenes (Group A),  Acinetobacter baumannii, Enterobacter cloacae, E. coli,  Klebsiella oxytoca, K. pneumoniae, Proteus sp.,  Serratia marcescens, Haemophilus influenzae,  Neisseria meningitidis, Pseudomonas aeruginosa, Candida  albicans, C. glabrata, C krusei, C parapsilosis,  C. tropicalis and the KPC resistance gene.    -  Cefazolin: R >16    -  Ampicillin/Sulbactam: R 16/8    -  Tobramycin: S <=4    -  Piperacillin/Tazobactam: S <=16    -  Gentamicin: S <=4    -  Ciprofloxacin: S <=1    -  Ceftriaxone: S <=1    Specimen Source: .Blood Blood-Peripheral    Organism: Blood Culture PCR    Organism: Enterobacter cloacae    Culture Results:   Growth in aerobic and anaerobic bottles: Gram Negative Rods  Identification and susceptibility to follow.    Organism Identification: Enterobacter cloacae  Blood Culture PCR    Method Type: PCR    Method Type: TSERING

## 2018-01-29 NOTE — PROGRESS NOTE ADULT - SUBJECTIVE AND OBJECTIVE BOX
Patient seen and examined at bedside with attending. Feeling much better from urologic symptom point of view. No more dysuria, no flank pain, no fevers, no feeling of incomplete emptying, no incontinence. Would treat symptomatic UTI for minimum 7-10 days. Follow up with Dr. Santiago as outpatient. Dr. Santiago took patient's contact information with him and his office will call to make appointment.

## 2018-01-30 ENCOUNTER — TRANSCRIPTION ENCOUNTER (OUTPATIENT)
Age: 73
End: 2018-01-30

## 2018-01-30 VITALS
SYSTOLIC BLOOD PRESSURE: 144 MMHG | OXYGEN SATURATION: 92 % | RESPIRATION RATE: 18 BRPM | DIASTOLIC BLOOD PRESSURE: 85 MMHG | TEMPERATURE: 98 F | HEART RATE: 69 BPM

## 2018-01-30 LAB
ANION GAP SERPL CALC-SCNC: 5 MMOL/L — SIGNIFICANT CHANGE UP (ref 5–17)
BUN SERPL-MCNC: 15 MG/DL — SIGNIFICANT CHANGE UP (ref 7–23)
CALCIUM SERPL-MCNC: 9.2 MG/DL — SIGNIFICANT CHANGE UP (ref 8.4–10.5)
CHLORIDE SERPL-SCNC: 93 MMOL/L — LOW (ref 96–108)
CO2 SERPL-SCNC: 32 MMOL/L — HIGH (ref 22–31)
CREAT SERPL-MCNC: 1.1 MG/DL — SIGNIFICANT CHANGE UP (ref 0.5–1.3)
GLUCOSE SERPL-MCNC: 303 MG/DL — HIGH (ref 70–99)
MAGNESIUM SERPL-MCNC: 1.4 MG/DL — LOW (ref 1.6–2.6)
POTASSIUM SERPL-MCNC: 3.8 MMOL/L — SIGNIFICANT CHANGE UP (ref 3.5–5.3)
POTASSIUM SERPL-SCNC: 3.8 MMOL/L — SIGNIFICANT CHANGE UP (ref 3.5–5.3)
SODIUM SERPL-SCNC: 130 MMOL/L — LOW (ref 135–145)

## 2018-01-30 PROCEDURE — 84443 ASSAY THYROID STIM HORMONE: CPT

## 2018-01-30 PROCEDURE — 96374 THER/PROPH/DIAG INJ IV PUSH: CPT

## 2018-01-30 PROCEDURE — 36415 COLL VENOUS BLD VENIPUNCTURE: CPT

## 2018-01-30 PROCEDURE — 87389 HIV-1 AG W/HIV-1&-2 AB AG IA: CPT

## 2018-01-30 PROCEDURE — 96361 HYDRATE IV INFUSION ADD-ON: CPT

## 2018-01-30 PROCEDURE — 71046 X-RAY EXAM CHEST 2 VIEWS: CPT

## 2018-01-30 PROCEDURE — 96375 TX/PRO/DX INJ NEW DRUG ADDON: CPT

## 2018-01-30 PROCEDURE — 99285 EMERGENCY DEPT VISIT HI MDM: CPT | Mod: 25

## 2018-01-30 PROCEDURE — 84540 ASSAY OF URINE/UREA-N: CPT

## 2018-01-30 PROCEDURE — 84100 ASSAY OF PHOSPHORUS: CPT

## 2018-01-30 PROCEDURE — 76770 US EXAM ABDO BACK WALL COMP: CPT

## 2018-01-30 PROCEDURE — 83735 ASSAY OF MAGNESIUM: CPT

## 2018-01-30 PROCEDURE — 80053 COMPREHEN METABOLIC PANEL: CPT

## 2018-01-30 PROCEDURE — 84156 ASSAY OF PROTEIN URINE: CPT

## 2018-01-30 PROCEDURE — 87086 URINE CULTURE/COLONY COUNT: CPT

## 2018-01-30 PROCEDURE — 80197 ASSAY OF TACROLIMUS: CPT

## 2018-01-30 PROCEDURE — 84484 ASSAY OF TROPONIN QUANT: CPT

## 2018-01-30 PROCEDURE — 81001 URINALYSIS AUTO W/SCOPE: CPT

## 2018-01-30 PROCEDURE — 82533 TOTAL CORTISOL: CPT

## 2018-01-30 PROCEDURE — 78707 K FLOW/FUNCT IMAGE W/O DRUG: CPT

## 2018-01-30 PROCEDURE — 74176 CT ABD & PELVIS W/O CONTRAST: CPT

## 2018-01-30 PROCEDURE — 84550 ASSAY OF BLOOD/URIC ACID: CPT

## 2018-01-30 PROCEDURE — 87150 DNA/RNA AMPLIFIED PROBE: CPT

## 2018-01-30 PROCEDURE — 82550 ASSAY OF CK (CPK): CPT

## 2018-01-30 PROCEDURE — 84300 ASSAY OF URINE SODIUM: CPT

## 2018-01-30 PROCEDURE — 85027 COMPLETE CBC AUTOMATED: CPT

## 2018-01-30 PROCEDURE — 85610 PROTHROMBIN TIME: CPT

## 2018-01-30 PROCEDURE — 87581 M.PNEUMON DNA AMP PROBE: CPT

## 2018-01-30 PROCEDURE — A9562: CPT

## 2018-01-30 PROCEDURE — 84133 ASSAY OF URINE POTASSIUM: CPT

## 2018-01-30 PROCEDURE — 87186 SC STD MICRODIL/AGAR DIL: CPT

## 2018-01-30 PROCEDURE — 99223 1ST HOSP IP/OBS HIGH 75: CPT | Mod: GC

## 2018-01-30 PROCEDURE — 80048 BASIC METABOLIC PNL TOTAL CA: CPT

## 2018-01-30 PROCEDURE — 87633 RESP VIRUS 12-25 TARGETS: CPT

## 2018-01-30 PROCEDURE — 71250 CT THORAX DX C-: CPT

## 2018-01-30 PROCEDURE — 83935 ASSAY OF URINE OSMOLALITY: CPT

## 2018-01-30 PROCEDURE — 85730 THROMBOPLASTIN TIME PARTIAL: CPT

## 2018-01-30 PROCEDURE — 87798 DETECT AGENT NOS DNA AMP: CPT

## 2018-01-30 PROCEDURE — 82553 CREATINE MB FRACTION: CPT

## 2018-01-30 PROCEDURE — 85025 COMPLETE CBC W/AUTO DIFF WBC: CPT

## 2018-01-30 PROCEDURE — 99238 HOSP IP/OBS DSCHRG MGMT 30/<: CPT

## 2018-01-30 PROCEDURE — 93005 ELECTROCARDIOGRAM TRACING: CPT

## 2018-01-30 PROCEDURE — 84105 ASSAY OF URINE PHOSPHORUS: CPT

## 2018-01-30 PROCEDURE — 87486 CHLMYD PNEUM DNA AMP PROBE: CPT

## 2018-01-30 PROCEDURE — 83605 ASSAY OF LACTIC ACID: CPT

## 2018-01-30 PROCEDURE — 87040 BLOOD CULTURE FOR BACTERIA: CPT

## 2018-01-30 RX ORDER — MAGNESIUM SULFATE 500 MG/ML
1 VIAL (ML) INJECTION ONCE
Qty: 0 | Refills: 0 | Status: COMPLETED | OUTPATIENT
Start: 2018-01-30 | End: 2018-01-30

## 2018-01-30 RX ORDER — CIPROFLOXACIN LACTATE 400MG/40ML
1 VIAL (ML) INTRAVENOUS
Qty: 20 | Refills: 0 | OUTPATIENT
Start: 2018-01-30 | End: 2018-02-08

## 2018-01-30 RX ORDER — CIPROFLOXACIN LACTATE 400MG/40ML
1 VIAL (ML) INTRAVENOUS
Qty: 24 | Refills: 0 | OUTPATIENT
Start: 2018-01-30 | End: 2018-02-10

## 2018-01-30 RX ORDER — AZTREONAM 2 G
1 VIAL (EA) INJECTION
Qty: 24 | Refills: 0 | OUTPATIENT
Start: 2018-01-30 | End: 2018-02-10

## 2018-01-30 RX ADMIN — VALSARTAN 320 MILLIGRAM(S): 80 TABLET ORAL at 07:20

## 2018-01-30 RX ADMIN — Medication 50 MILLIGRAM(S): at 07:20

## 2018-01-30 RX ADMIN — Medication 40 MILLIGRAM(S): at 07:20

## 2018-01-30 RX ADMIN — Medication 100 MILLIGRAM(S): at 07:20

## 2018-01-30 RX ADMIN — PIPERACILLIN AND TAZOBACTAM 200 GRAM(S): 4; .5 INJECTION, POWDER, LYOPHILIZED, FOR SOLUTION INTRAVENOUS at 00:25

## 2018-01-30 RX ADMIN — CLOPIDOGREL BISULFATE 75 MILLIGRAM(S): 75 TABLET, FILM COATED ORAL at 12:56

## 2018-01-30 RX ADMIN — Medication 100 MILLIGRAM(S): at 12:56

## 2018-01-30 RX ADMIN — Medication 300 MILLIGRAM(S): at 09:25

## 2018-01-30 RX ADMIN — FAMOTIDINE 20 MILLIGRAM(S): 10 INJECTION INTRAVENOUS at 12:56

## 2018-01-30 RX ADMIN — Medication 1000 UNIT(S): at 12:57

## 2018-01-30 RX ADMIN — TACROLIMUS 1 MILLIGRAM(S): 5 CAPSULE ORAL at 09:25

## 2018-01-30 RX ADMIN — PIPERACILLIN AND TAZOBACTAM 200 GRAM(S): 4; .5 INJECTION, POWDER, LYOPHILIZED, FOR SOLUTION INTRAVENOUS at 12:57

## 2018-01-30 RX ADMIN — DABIGATRAN ETEXILATE MESYLATE 150 MILLIGRAM(S): 150 CAPSULE ORAL at 09:25

## 2018-01-30 RX ADMIN — PIPERACILLIN AND TAZOBACTAM 200 GRAM(S): 4; .5 INJECTION, POWDER, LYOPHILIZED, FOR SOLUTION INTRAVENOUS at 07:19

## 2018-01-30 RX ADMIN — MYCOPHENOLATE MOFETIL 750 MILLIGRAM(S): 250 CAPSULE ORAL at 09:24

## 2018-01-30 RX ADMIN — BRIMONIDINE TARTRATE 1 DROP(S): 2 SOLUTION/ DROPS OPHTHALMIC at 12:56

## 2018-01-30 RX ADMIN — Medication 100 GRAM(S): at 10:43

## 2018-01-30 NOTE — DISCHARGE NOTE ADULT - HOSPITAL COURSE
72M with Kidney transplant 2011, HFpEF, AS, Afib on pradaxa, CAD s/p WALLY to dLCx/pLCx (1/2017), HTN, HLD, BPH, DM and active smoker/drinker who presented with difficulty urinating for 3-4 days. He was admitted due to severe sepsis secondary to pyelonephritis of transplant Kidney. He was given IV fluids and started on Zosyn. Blood cultures grew Enterobacter and Enterocoocus; UCx showed only Enterobacter. ID was consulted and recommended XXXXX. The patient was transitioned to oral antibiotics and discharged in good condition with plans to follow up with Dr. Jason as an outpatient. 72M with Kidney transplant 2011, HFpEF, AS, Afib on pradaxa, CAD s/p WALLY to dLCx/pLCx (1/2017), HTN, HLD, BPH, DM and active smoker/drinker who presented with difficulty urinating for 3-4 days. He was admitted due to severe sepsis secondary to pyelonephritis of transplant Kidney. He was given IV fluids and started on Zosyn. Blood cultures grew Enterobacter and Enterococcus UCx showed only Enterobacter. Microbiology was contacted and stated there was no sign of Enterococcus in PCR results and the result was a lab error. ID was consulted and recommended transition to Ciprofloxacin as an outpatient. The patient was transitioned to oral antibiotics and discharged in good condition with plans to follow up with Dr. Jason as an outpatient. 72M with Kidney transplant 2011, HFpEF, AS, Afib on pradaxa, CAD s/p WALLY to dLCx/pLCx (1/2017), HTN, HLD, BPH, DM and active smoker/drinker who presented with difficulty urinating for 3-4 days. He was admitted due to severe sepsis secondary to pyelonephritis of transplant Kidney. He was given IV fluids and started on Zosyn. Blood cultures grew Enterobacter and Enterococcus UCx showed only Enterobacter. Microbiology was contacted and stated there was no sign of Enterococcus in PCR results and the result was a lab error. ID was consulted and recommended transition to 800-160mg as an outpatient. The patient was transitioned to oral antibiotics and discharged in good condition with plans to follow up with Dr. Jason as an outpatient.

## 2018-01-30 NOTE — DISCHARGE NOTE ADULT - MEDICATION SUMMARY - MEDICATIONS TO TAKE
I will START or STAY ON the medications listed below when I get home from the hospital:    valsartan 320 mg oral tablet  -- 1 tab(s) by mouth once a day  -- Indication: For Chronic diastolic congestive heart failure    diltiazem 300 mg/24 hours oral tablet, extended release  -- 1 tab(s) by mouth once a day  -- Indication: For Chronic atrial fibrillation    Pradaxa 150 mg oral capsule  -- 1 cap(s) by mouth 2 times a day  -- Indication: For Chronic atrial fibrillation    Prandin 2 mg oral tablet  -- 1 tab(s) by mouth once a day (at bedtime)  -- Indication: For Type 2 diabetes mellitus without complication, without long-term current use of insulin    Tradjenta 5 mg oral tablet  -- 1 tab(s) by mouth once a day  -- Indication: For Type 2 diabetes mellitus without complication, without long-term current use of insulin    metFORMIN 1000 mg oral tablet  -- 1 tab(s) by mouth 2 times a day    RESTART ON SATURDAY JAN. 28, 2017.  -- Indication: For Type 2 diabetes mellitus without complication, without long-term current use of insulin    Lipitor 10 mg oral tablet  -- 1 tab(s) by mouth once a day (at bedtime)  -- Indication: For Hyperlipidemia, unspecified hyperlipidemia type    clopidogrel 75 mg oral tablet  -- 1 tab(s) by mouth once a day.  -- Indication: For Need for prophylactic measure    Toprol-XL 50 mg oral tablet, extended release  -- 1 tab(s) by mouth once a day  -- Indication: For Chronic atrial fibrillation    bimatoprost topical ophthalmic  -- Apply on skin to affected area once a day (at bedtime)  -- Indication: For Need for prophylactic measure    furosemide 40 mg oral tablet  -- 1 tab(s) by mouth once a day  -- Indication: For Chronic diastolic congestive heart failure    raNITIdine 150 mg oral capsule  -- 1 cap(s) by mouth once a day  -- Indication: For Need for prophylactic measure    famotidine 20 mg oral tablet  -- 1 tab(s) by mouth once a day  -- Indication: For Need for prophylactic measure    tacrolimus 1 mg oral capsule  -- 1 cap(s) by mouth once a day (at bedtime)  -- Indication: For Kidney transplant infection    mycophenolic acid 360 mg oral delayed release tablet  -- 2 tab(s) by mouth every 12 hours  -- Indication: For Kidney transplant infection    docusate sodium 100 mg oral capsule  -- 1 cap(s) by mouth 2 times a day  -- Indication: For Need for prophylactic measure    magnesium oxide 400 mg (241.3 mg elemental magnesium) oral tablet  -- 1 tab(s) by mouth once a day  -- Indication: For Need for prophylactic measure    potassium chloride 20 mEq oral tablet, extended release  -- 1 tab(s) by mouth once a day  -- Indication: For Need for prophylactic measure    brimonidine-timolol 0.2%-0.5% ophthalmic solution  -- 1 drop(s) to each affected eye every 12 hours  -- Indication: For Need for prophylactic measure    ciprofloxacin 500 mg oral tablet  -- 1 tab(s) by mouth every 12 hours   -- Avoid prolonged or excessive exposure to direct and/or artificial sunlight while taking this medication.  Check with your doctor before becoming pregnant.  Do not take dairy products, antacids, or iron preparations within one hour of this medication.  Finish all this medication unless otherwise directed by prescriber.  Medication should be taken with plenty of water.    -- Indication: For Kidney transplant infection    thiamine 100 mg oral tablet  -- 1 tab(s) by mouth once a day  -- Indication: For Nutrition, metabolism, and development symptoms    Vitamin D3 1000 intl units oral capsule  -- 1 cap(s) by mouth once a day  -- Indication: For Nutrition, metabolism, and development symptoms I will START or STAY ON the medications listed below when I get home from the hospital:    valsartan 320 mg oral tablet  -- 1 tab(s) by mouth once a day  -- Indication: For Chronic diastolic congestive heart failure    diltiazem 300 mg/24 hours oral tablet, extended release  -- 1 tab(s) by mouth once a day  -- Indication: For Chronic atrial fibrillation    Pradaxa 150 mg oral capsule  -- 1 cap(s) by mouth 2 times a day  -- Indication: For Chronic atrial fibrillation    Prandin 2 mg oral tablet  -- 1 tab(s) by mouth once a day (at bedtime)  -- Indication: For Type 2 diabetes mellitus without complication, without long-term current use of insulin    Tradjenta 5 mg oral tablet  -- 1 tab(s) by mouth once a day  -- Indication: For Type 2 diabetes mellitus without complication, without long-term current use of insulin    metFORMIN 1000 mg oral tablet  -- 1 tab(s) by mouth 2 times a day    RESTART ON SATURDAY JAN. 28, 2017.  -- Indication: For Type 2 diabetes mellitus without complication, without long-term current use of insulin    Lipitor 10 mg oral tablet  -- 1 tab(s) by mouth once a day (at bedtime)  -- Indication: For Hyperlipidemia, unspecified hyperlipidemia type    clopidogrel 75 mg oral tablet  -- 1 tab(s) by mouth once a day.  -- Indication: For Need for prophylactic measure    Toprol-XL 50 mg oral tablet, extended release  -- 1 tab(s) by mouth once a day  -- Indication: For Chronic atrial fibrillation    bimatoprost topical ophthalmic  -- Apply on skin to affected area once a day (at bedtime)  -- Indication: For Need for prophylactic measure    furosemide 40 mg oral tablet  -- 1 tab(s) by mouth once a day  -- Indication: For Chronic diastolic congestive heart failure    raNITIdine 150 mg oral capsule  -- 1 cap(s) by mouth once a day  -- Indication: For Need for prophylactic measure    famotidine 20 mg oral tablet  -- 1 tab(s) by mouth once a day  -- Indication: For Need for prophylactic measure    tacrolimus 1 mg oral capsule  -- 1 cap(s) by mouth once a day (at bedtime)  -- Indication: For Kidney transplant infection    mycophenolic acid 360 mg oral delayed release tablet  -- 2 tab(s) by mouth every 12 hours  -- Indication: For Kidney transplant infection    docusate sodium 100 mg oral capsule  -- 1 cap(s) by mouth 2 times a day  -- Indication: For Need for prophylactic measure    magnesium oxide 400 mg (241.3 mg elemental magnesium) oral tablet  -- 1 tab(s) by mouth once a day  -- Indication: For Need for prophylactic measure    potassium chloride 20 mEq oral tablet, extended release  -- 1 tab(s) by mouth once a day  -- Indication: For Need for prophylactic measure    brimonidine-timolol 0.2%-0.5% ophthalmic solution  -- 1 drop(s) to each affected eye every 12 hours  -- Indication: For Need for prophylactic measure    Bactrim  mg-160 mg oral tablet  -- 1 tab(s) by mouth every 12 hours   -- Avoid prolonged or excessive exposure to direct and/or artificial sunlight while taking this medication.  Finish all this medication unless otherwise directed by prescriber.  Medication should be taken with plenty of water.    -- Indication: For Kidney transplant infection    thiamine 100 mg oral tablet  -- 1 tab(s) by mouth once a day  -- Indication: For Nutrition, metabolism, and development symptoms    Vitamin D3 1000 intl units oral capsule  -- 1 cap(s) by mouth once a day  -- Indication: For Nutrition, metabolism, and development symptoms

## 2018-01-30 NOTE — DISCHARGE NOTE ADULT - PATIENT PORTAL LINK FT
“You can access the [employer] Patient Portal, offered by [facility], by registering with the following website: [email or web address]”

## 2018-01-30 NOTE — DISCHARGE NOTE ADULT - CARE PROVIDER_API CALL
Reza Jason), Internal Medicine; Nephrology  [address] [address]  Phone: [phone]  Fax: [phone]    Vicente Santiago), Urology  [address] [place] [address]  Phone: [phone]  Fax: [phone]

## 2018-01-30 NOTE — CHART NOTE - NSCHARTNOTEFT_GEN_A_CORE
Patient examined. Now afebrile. LORENA resolved. Appreciate ID noted. Continue abx per ID and follow up with me in office within 1 week. Plan reviewed.

## 2018-01-30 NOTE — DISCHARGE NOTE ADULT - CARE PROVIDERS DIRECT ADDRESSES
,[email or web address]

## 2018-01-30 NOTE — CONSULT NOTE ADULT - SUBJECTIVE AND OBJECTIVE BOX
INFECTIOUS DISEASE SERVICE INITIAL CONSULTATION NOTE    HPI:  72M with Kidney transplant 2011, HFpEF, AS, Afib on pradaxa, CAD s/p WALLY to dLCx/pLCx (1/2017), HTN, HLD, BPH, DM and active smoker/drinker who presents with difficulty urinating for 3-4 days. He states he was in his usual state of health when earlier this week he started having increased dysuria and retention with associated incontinence. He stated that he had the sudden urge to urinate and would not have enough time to make it to the restroom. He stated that today he felt feverish, took Tylenol. Today he felt the urge to urinate but could not, and called Dr. Jason, who advised him to come to the ED.   He denies Chills, Nausea, Vomiting, Hematuria, Diarrhea, Constipation. He states he has had 2 weeks of progressive Dyspnea on exertion with associated cough.   In the ED T100.5, , /60, RR18 L2Ycl10%. The patient was given 2L NS Bolus, Ceftriaxone, Azithromycin, Tylenol, and Solu-cortef. (26 Jan 2018 17:37)  Patient was admitted to [facility], treated with Zosyn. Blood cultures grew enterobacter and enterococcus, urine culture grew enterobacter.   ID consulted for presence of enterococcus and enterobacter on blood cultures, but only enterobacter in the blood.   Patient reports that he is now urinating normally, no fevers, or chills       PAST MEDICAL & SURGICAL HISTORY:  Congestive heart failure  Aortic stenosis  Coronary artery disease  AV fistula: Left forearm  Hyperlipidemia: Hyperlipidemia  Essential hypertension: HTN (hypertension)  Complication of transplanted kidney: s/p right sided renal transplant  Atrial fibrillation: Atrial fibrillation  Type 2 diabetes mellitus: Diabetes  AVF (arteriovenous fistula)  S/P primary angioplasty with coronary stent  History of kidney transplant: right      REVIEW OF SYSTEMS:  Constitutional: no weakness, no fevers, no chills  Dermatologic: no rash  Respiratory: no SOB, no cough  Cardiovascular: no chest pain, no palpitations  Gastrointestinal: no nausea, no vomiting, no diarrhea  Genitourinary: no dysuria, no urinary frequency, no hematuria, no urinary retention  Musculoskeletal:	no weakness, no joint swelling/pain  Neurological: no focal weakness or numbness  Endocrine: no polyuria, no polydipsia    ACTIVE ANTIMICROBIAL/ANTIBIOTIC MEDICATIONS:  piperacillin/tazobactam IVPB. 3.375 Gram(s) IV Intermittent every 6 hours      OTHER MEDICATIONS:  atorvastatin 10 milliGRAM(s) Oral at bedtime  brimonidine 0.2% Ophthalmic Solution 1 Drop(s) Both EYES daily  cholecalciferol 1000 Unit(s) Oral daily  clopidogrel Tablet 75 milliGRAM(s) Oral daily  dabigatran 150 milliGRAM(s) Oral every 12 hours  dextrose 5%. 1000 milliLiter(s) IV Continuous <Continuous>  dextrose 50% Injectable 12.5 Gram(s) IV Push once  dextrose 50% Injectable 25 Gram(s) IV Push once  dextrose 50% Injectable 25 Gram(s) IV Push once  dextrose Gel 1 Dose(s) Oral once PRN  diltiazem    milliGRAM(s) Oral daily  docusate sodium 100 milliGRAM(s) Oral two times a day  famotidine    Tablet 20 milliGRAM(s) Oral daily  furosemide    Tablet 40 milliGRAM(s) Oral daily  glucagon  Injectable 1 milliGRAM(s) IntraMuscular once PRN  insulin lispro (HumaLOG) corrective regimen sliding scale   SubCutaneous Before meals and at bedtime  metoprolol succinate ER 50 milliGRAM(s) Oral daily  mycophenolate mofetil 750 milliGRAM(s) Oral every 12 hours  sodium chloride 0.9%. 1000 milliLiter(s) IV Continuous <Continuous>  tacrolimus 1 milliGRAM(s) Oral every 12 hours  thiamine 100 milliGRAM(s) Oral daily  timolol 0.5% Solution 1 Drop(s) Both EYES at bedtime  valsartan 320 milliGRAM(s) Oral daily      ALLERGIES:  Allergies    No Known Allergies    Intolerances        SOCIAL HISTORY:      FAMILY HISTORY:  FAMILY HISTORY:  No pertinent family history in first degree relatives      VITAL SIGNS:  ICU Vital Signs Last 24 Hrs  T(C): 37 (30 Jan 2018 09:06), Max: 37.3 (29 Jan 2018 17:42)  T(F): 98.6 (30 Jan 2018 09:06), Max: 99.1 (29 Jan 2018 17:42)  HR: 82 (30 Jan 2018 09:06) (79 - 85)  BP: 147/84 (30 Jan 2018 09:06) (120/77 - 163/80)  BP(mean): --  ABP: --  ABP(mean): --  RR: 16 (30 Jan 2018 09:06) (16 - 18)  SpO2: 94% (30 Jan 2018 09:06) (94% - 95%)      PHYSICAL EXAM:  Constitutional: WDWN  Head: NC/AT  Eyes: PERRLA, anicteric slcera  ENMT: no rhinorrhea; no sinus tenderness on palpation; no oropharyngeal lesions, erythema, or exudates	  Neck: supple; no JVD or LAD  Respiratory: CTA B/L  Cardiovascular: +S1/S2, RRR; II/VI DAVID   Gastrointestinal: soft, NT/ND; +BSx4, no HSM  Extremities: WWP; no clubbing, cyanosis, or edema; hyperpigmentation of the distal LEs with no signs of infection  Vascular: 2+ radial, DP/PT pulses B/L  Neurologic: AAOx3; no focal deficits    LABS:    01-30    130<L>  |  93<L>  |  15  ----------------------------<  303<H>  3.8   |  32<H>  |  1.10    Ca    9.2      30 Jan 2018 07:31  Mg     1.4     01-30            MICROBIOLOGY:    Culture - Blood (collected 01-29-18 @ 13:22)  Source: .Blood Blood  Preliminary Report (01-30-18 @ 02:01):    No growth at 12 hours    Culture - Blood (collected 01-29-18 @ 13:22)  Source: .Blood Blood  Preliminary Report (01-30-18 @ 02:01):    No growth at 12 hours    Culture - Urine (collected 01-26-18 @ 15:26)  Source: .Urine Clean Catch (Midstream)  Final Report (01-28-18 @ 11:14):    >100,000 CFU/ml Enterobacter cloacae  Organism: Enterobacter cloacae (01-28-18 @ 11:14)  Organism: Enterobacter cloacae (01-28-18 @ 11:14)      -  Ampicillin: R >16      -  Ampicillin/Sulbactam: R >16/8      -  Cefazolin: R >16      -  Ceftriaxone: S <=1      -  Ciprofloxacin: S <=1      -  Gentamicin: S <=4      -  Nitrofurantoin: S <=32      -  Piperacillin/Tazobactam: S <=16      -  Tobramycin: S <=4      -  Trimethoprim/Sulfamethoxazole: S <=2/38      Method Type: TSERING    Culture - Blood (collected 01-26-18 @ 14:49)  Source: .Blood Blood-Peripheral  Gram Stain (01-27-18 @ 09:27):    Aerobic and Anaerobic Bottle: Gram Negative Rods    Result called to and read back by_ JONN Goldstein RN  01/27/2018 09:26:59  Preliminary Report (01-28-18 @ 09:49):    Growth in aerobic and anaerobic bottles: Gram Negative Rods    Identification and susceptibility to follow.    Culture - Blood (collected 01-26-18 @ 14:49)  Source: .Blood Blood-Peripheral  Gram Stain (01-27-18 @ 09:28):    Aerobic and Anaerobic Bottle: Gram Negative Rods    Result called to and read back by_ JONN Goldstein RN  01/27/2018 09:28:33    ***Blood Panel PCR results on this specimen are available    approximately 3 hours after the Gram stain result.***    Gram stain, PCR, and/or culture results may not always    correspond due to difference in methodologies.    ************************************************************    This PCR assay was performed using [employer].    The following targets are tested for: Enterococcus,    vancomycin resistant enterococci, Listeria monocytogenes,    coagulase negative staphylococci, S. aureus,    methicillin resistant S. aureus, Streptococcus agalactiae    (Group B), S. pneumoniae, S. pyogenes (Group A),    Acinetobacter baumannii, Enterobacter cloacae, E. coli,    Klebsiella oxytoca, K. pneumoniae, Proteus sp.,    Serratia marcescens, Haemophilus influenzae,    Neisseria meningitidis, Pseudomonas aeruginosa, Candida    albicans, C. glabrata, C krusei, C parapsilosis,    C. tropicalis and the KPC resistance gene.  Final Report (01-29-18 @ 13:42):    Growth in aerobic and anaerobic bottles: Enterobacter cloacae  Organism: Enterobacter cloacae  Blood Culture PCR (01-29-18 @ 13:42)  Organism: Blood Culture PCR (01-29-18 @ 13:42)      -  Enterobacter cloacae complex: Detec      -  Enterococcus species: Detec      Method Type: PCR  Organism: Enterobacter cloacae (01-29-18 @ 13:42)      -  Ampicillin: R >16      -  Ampicillin/Sulbactam: R 16/8      -  Cefazolin: R >16      -  Ceftriaxone: S <=1      -  Ciprofloxacin: S <=1      -  Gentamicin: S <=4      -  Piperacillin/Tazobactam: S <=16      -  Tobramycin: S <=4      -  Trimethoprim/Sulfamethoxazole: S <=2/38      Method Type: TSERING        RADIOLOGY & ADDITIONAL STUDIES:

## 2018-01-30 NOTE — DISCHARGE NOTE ADULT - CARE PLAN
Principal Discharge DX:	Severe sepsis  Goal:	To treat underlying infection  Assessment and plan of treatment:	You were admitted due to a severe infection secondary to a condition called pyelonephritis or infection of your kidney. You were given intravenous fluids and IV Zosyn for your infection. On day of discharge there were not continued signs of infection.  Secondary Diagnosis:	Kidney transplant infection  Assessment and plan of treatment:	You were treated with Zosyn, an antibiotic, as an inpatient. Please follow up with Dr. Jason as an outpatient. Principal Discharge DX:	Severe sepsis  Goal:	To treat underlying infection  Assessment and plan of treatment:	You were admitted due to a severe infection secondary to a condition called pyelonephritis or infection of your kidney. You were given intravenous fluids and IV Zosyn for your infection. On day of discharge there were not continued signs of infection. You were transitioned to XXXX to finish your course as an outpatient.  Secondary Diagnosis:	Kidney transplant infection  Assessment and plan of treatment:	You were treated with Zosyn, an antibiotic, as an inpatient. You were transitioned to  Please follow up with Dr. Jason as an outpatient. Principal Discharge DX:	Severe sepsis  Goal:	To treat underlying infection  Assessment and plan of treatment:	You were admitted due to a severe infection secondary to a condition called pyelonephritis or infection of your kidney. You were given intravenous fluids and IV Zosyn for your infection. On day of discharge there were not continued signs of infection. You were transitioned to Ciprofloxacin 500mg every 12 hours for 12 more days to finish your course as an outpatient.  Secondary Diagnosis:	Kidney transplant infection  Assessment and plan of treatment:	You were treated with Zosyn, an antibiotic, as an inpatient. You were transitioned to Ciprofloxacin 500mg every 12 hours for 12 more days to finish your course as an outpatient.  Please follow up with Dr. Jason as an outpatient. Principal Discharge DX:	Severe sepsis  Goal:	To treat underlying infection  Assessment and plan of treatment:	You were admitted due to a severe infection secondary to a condition called pyelonephritis or infection of your kidney. You were given intravenous fluids and IV Zosyn for your infection. On day of discharge there were not continued signs of infection. You were transitioned to Bactrim 800-160mg every 12 hours for 12 more days to finish your course as an outpatient.  Secondary Diagnosis:	Kidney transplant infection  Assessment and plan of treatment:	You were treated with Zosyn, an antibiotic, as an inpatient. You were transitioned to 800-160mg 500mg every 12 hours for 12 more days to finish your course as an outpatient.  Please follow up with Dr. Jason as an outpatient.

## 2018-01-30 NOTE — DISCHARGE NOTE ADULT - PLAN OF CARE
To treat underlying infection You were admitted due to a severe infection secondary to a condition called pyelonephritis or infection of your kidney. You were given intravenous fluids and IV Zosyn for your infection. On day of discharge there were not continued signs of infection. You were treated with Zosyn, an antibiotic, as an inpatient. Please follow up with Dr. Jason as an outpatient. You were admitted due to a severe infection secondary to a condition called pyelonephritis or infection of your kidney. You were given intravenous fluids and IV Zosyn for your infection. On day of discharge there were not continued signs of infection. You were transitioned to XXXX to finish your course as an outpatient. You were treated with Zosyn, an antibiotic, as an inpatient. You were transitioned to  Please follow up with Dr. Jason as an outpatient. You were admitted due to a severe infection secondary to a condition called pyelonephritis or infection of your kidney. You were given intravenous fluids and IV Zosyn for your infection. On day of discharge there were not continued signs of infection. You were transitioned to Ciprofloxacin 500mg every 12 hours for 12 more days to finish your course as an outpatient. You were treated with Zosyn, an antibiotic, as an inpatient. You were transitioned to Ciprofloxacin 500mg every 12 hours for 12 more days to finish your course as an outpatient.  Please follow up with Dr. Jason as an outpatient. You were admitted due to a severe infection secondary to a condition called pyelonephritis or infection of your kidney. You were given intravenous fluids and IV Zosyn for your infection. On day of discharge there were not continued signs of infection. You were transitioned to Bactrim 800-160mg every 12 hours for 12 more days to finish your course as an outpatient. You were treated with Zosyn, an antibiotic, as an inpatient. You were transitioned to 800-160mg 500mg every 12 hours for 12 more days to finish your course as an outpatient.  Please follow up with Dr. Jason as an outpatient.

## 2018-01-30 NOTE — DISCHARGE NOTE ADULT - MEDICATION SUMMARY - MEDICATIONS TO STOP TAKING
I will STOP taking the medications listed below when I get home from the hospital:    azithromycin 250 mg oral tablet  -- 1 tab(s) by mouth every 24 hours

## 2018-01-30 NOTE — CONSULT NOTE ADULT - ATTENDING COMMENTS
Agree with above.  The enterococcus was not present in the urine nor was it seen on gram stain or culture.  This is likely a PCR error.  Can discharge patient on Bactrim 1 DS PO q12 (bactrim does not interact with his transplant meds whereas cipro has some interactions with MMF and tacrolimus)    Please reconsult as needed

## 2018-01-31 LAB
-  AMPICILLIN/SULBACTAM: SIGNIFICANT CHANGE UP
-  AMPICILLIN: SIGNIFICANT CHANGE UP
-  CEFAZOLIN: SIGNIFICANT CHANGE UP
-  CEFTRIAXONE: SIGNIFICANT CHANGE UP
-  CIPROFLOXACIN: SIGNIFICANT CHANGE UP
-  GENTAMICIN: SIGNIFICANT CHANGE UP
-  PIPERACILLIN/TAZOBACTAM: SIGNIFICANT CHANGE UP
-  TOBRAMYCIN: SIGNIFICANT CHANGE UP
-  TRIMETHOPRIM/SULFAMETHOXAZOLE: SIGNIFICANT CHANGE UP
CULTURE RESULTS: SIGNIFICANT CHANGE UP
METHOD TYPE: SIGNIFICANT CHANGE UP
ORGANISM # SPEC MICROSCOPIC CNT: SIGNIFICANT CHANGE UP
ORGANISM # SPEC MICROSCOPIC CNT: SIGNIFICANT CHANGE UP
SPECIMEN SOURCE: SIGNIFICANT CHANGE UP
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
## 2018-02-02 DIAGNOSIS — I25.10 ATHEROSCLEROTIC HEART DISEASE OF NATIVE CORONARY ARTERY WITHOUT ANGINA PECTORIS: ICD-10-CM

## 2018-02-02 DIAGNOSIS — Z79.84 LONG TERM (CURRENT) USE OF ORAL HYPOGLYCEMIC DRUGS: ICD-10-CM

## 2018-02-02 DIAGNOSIS — A41.9 SEPSIS, UNSPECIFIED ORGANISM: ICD-10-CM

## 2018-02-02 DIAGNOSIS — B96.89 OTHER SPECIFIED BACTERIAL AGENTS AS THE CAUSE OF DISEASES CLASSIFIED ELSEWHERE: ICD-10-CM

## 2018-02-02 DIAGNOSIS — N40.1 BENIGN PROSTATIC HYPERPLASIA WITH LOWER URINARY TRACT SYMPTOMS: ICD-10-CM

## 2018-02-02 DIAGNOSIS — T86.13 KIDNEY TRANSPLANT INFECTION: ICD-10-CM

## 2018-02-02 DIAGNOSIS — A41.59 OTHER GRAM-NEGATIVE SEPSIS: ICD-10-CM

## 2018-02-02 DIAGNOSIS — R65.20 SEVERE SEPSIS WITHOUT SEPTIC SHOCK: ICD-10-CM

## 2018-02-02 DIAGNOSIS — I48.2 CHRONIC ATRIAL FIBRILLATION: ICD-10-CM

## 2018-02-02 DIAGNOSIS — E11.9 TYPE 2 DIABETES MELLITUS WITHOUT COMPLICATIONS: ICD-10-CM

## 2018-02-02 DIAGNOSIS — Z79.899 OTHER LONG TERM (CURRENT) DRUG THERAPY: ICD-10-CM

## 2018-02-02 DIAGNOSIS — Z94.0 KIDNEY TRANSPLANT STATUS: ICD-10-CM

## 2018-02-02 DIAGNOSIS — E86.0 DEHYDRATION: ICD-10-CM

## 2018-02-02 DIAGNOSIS — R33.8 OTHER RETENTION OF URINE: ICD-10-CM

## 2018-02-02 DIAGNOSIS — E78.5 HYPERLIPIDEMIA, UNSPECIFIED: ICD-10-CM

## 2018-02-02 DIAGNOSIS — Z79.02 LONG TERM (CURRENT) USE OF ANTITHROMBOTICS/ANTIPLATELETS: ICD-10-CM
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
## 2018-02-02 DIAGNOSIS — I35.0 NONRHEUMATIC AORTIC (VALVE) STENOSIS: ICD-10-CM

## 2018-02-02 DIAGNOSIS — I13.0 HYPERTENSIVE HEART AND CHRONIC KIDNEY DISEASE WITH HEART FAILURE AND STAGE 1 THROUGH STAGE 4 CHRONIC KIDNEY DISEASE, OR UNSPECIFIED CHRONIC KIDNEY DISEASE: ICD-10-CM

## 2018-02-02 DIAGNOSIS — F17.210 NICOTINE DEPENDENCE, CIGARETTES, UNCOMPLICATED: ICD-10-CM

## 2018-02-02 DIAGNOSIS — I50.32 CHRONIC DIASTOLIC (CONGESTIVE) HEART FAILURE: ICD-10-CM

## 2018-02-02 DIAGNOSIS — Z72.89 OTHER PROBLEMS RELATED TO LIFESTYLE: ICD-10-CM

## 2018-02-02 DIAGNOSIS — Z95.5 PRESENCE OF CORONARY ANGIOPLASTY IMPLANT AND GRAFT: ICD-10-CM

## 2018-02-02 DIAGNOSIS — N18.9 CHRONIC KIDNEY DISEASE, UNSPECIFIED: ICD-10-CM

## 2018-02-02 DIAGNOSIS — E87.1 HYPO-OSMOLALITY AND HYPONATREMIA: ICD-10-CM

## 2018-02-02 DIAGNOSIS — N17.9 ACUTE KIDNEY FAILURE, UNSPECIFIED: ICD-10-CM

## 2018-02-02 DIAGNOSIS — N39.0 URINARY TRACT INFECTION, SITE NOT SPECIFIED: ICD-10-CM

## 2018-02-02 DIAGNOSIS — N12 TUBULO-INTERSTITIAL NEPHRITIS, NOT SPECIFIED AS ACUTE OR CHRONIC: ICD-10-CM

## 2018-02-02 DIAGNOSIS — Z28.21 IMMUNIZATION NOT CARRIED OUT BECAUSE OF PATIENT REFUSAL: ICD-10-CM

## 2018-02-04 ENCOUNTER — RX RENEWAL (OUTPATIENT)
Age: 73
End: 2018-02-04

## 2018-02-11 ENCOUNTER — RX RENEWAL (OUTPATIENT)
Age: 73
End: 2018-02-11

## 2018-02-11 RX ORDER — DOCUSATE SODIUM 100 MG/1
100 CAPSULE, LIQUID FILLED ORAL 3 TIMES DAILY
Qty: 30 | Refills: 0 | Status: ACTIVE | COMMUNITY
Start: 2018-01-07 | End: 1900-01-01

## 2018-02-26 ENCOUNTER — LABORATORY RESULT (OUTPATIENT)
Age: 73
End: 2018-02-26

## 2018-02-26 ENCOUNTER — APPOINTMENT (OUTPATIENT)
Dept: NEPHROLOGY | Facility: CLINIC | Age: 73
End: 2018-02-26
Payer: MEDICARE

## 2018-02-26 VITALS — HEART RATE: 72 BPM | DIASTOLIC BLOOD PRESSURE: 66 MMHG | SYSTOLIC BLOOD PRESSURE: 137 MMHG

## 2018-02-26 VITALS — SYSTOLIC BLOOD PRESSURE: 142 MMHG | DIASTOLIC BLOOD PRESSURE: 85 MMHG | HEART RATE: 86 BPM

## 2018-02-26 VITALS — TEMPERATURE: 99.5 F

## 2018-02-26 DIAGNOSIS — R07.89 OTHER CHEST PAIN: ICD-10-CM

## 2018-02-26 DIAGNOSIS — E66.9 OBESITY, UNSPECIFIED: ICD-10-CM

## 2018-02-26 PROCEDURE — 99215 OFFICE O/P EST HI 40 MIN: CPT | Mod: 25

## 2018-02-26 PROCEDURE — 36415 COLL VENOUS BLD VENIPUNCTURE: CPT

## 2018-02-28 ENCOUNTER — MOBILE ON CALL (OUTPATIENT)
Age: 73
End: 2018-02-28

## 2018-03-04 LAB
25(OH)D3 SERPL-MCNC: 33.7 NG/ML
ALBUMIN SERPL ELPH-MCNC: 3.9 G/DL
ALP BLD-CCNC: 50 U/L
ALT SERPL-CCNC: 14 U/L
ANION GAP SERPL CALC-SCNC: 17 MMOL/L
APPEARANCE: CLEAR
AST SERPL-CCNC: 20 U/L
BASOPHILS # BLD AUTO: 0.11 K/UL
BASOPHILS NFR BLD AUTO: 1.8 %
BILIRUB SERPL-MCNC: 1.2 MG/DL
BILIRUBIN URINE: ABNORMAL
BLOOD URINE: NEGATIVE
BUN SERPL-MCNC: 16 MG/DL
CALCIUM SERPL-MCNC: 9.2 MG/DL
CALCIUM SERPL-MCNC: 9.2 MG/DL
CHLORIDE SERPL-SCNC: 102 MMOL/L
CHOLEST SERPL-MCNC: 107 MG/DL
CHOLEST/HDLC SERPL: 2.1 RATIO
CO2 SERPL-SCNC: 20 MMOL/L
COLOR: ABNORMAL
CREAT SERPL-MCNC: 0.96 MG/DL
CREAT SPEC-SCNC: 160 MG/DL
EOSINOPHIL # BLD AUTO: 0 K/UL
EOSINOPHIL NFR BLD AUTO: 0
GLUCOSE QUALITATIVE U: NEGATIVE MG/DL
GLUCOSE SERPL-MCNC: 153 MG/DL
HBA1C MFR BLD HPLC: 6.4 %
HCT VFR BLD CALC: 31.4 %
HDLC SERPL-MCNC: 50 MG/DL
HGB BLD-MCNC: 9.7 G/DL
KETONES URINE: NEGATIVE
LDLC SERPL CALC-MCNC: 42 MG/DL
LEUKOCYTE ESTERASE URINE: NEGATIVE
LYMPHOCYTES # BLD AUTO: 0.46 K/UL
LYMPHOCYTES NFR BLD AUTO: 7.3 %
MAGNESIUM SERPL-MCNC: 1.7 MG/DL
MAN DIFF?: NORMAL
MCHC RBC-ENTMCNC: 30 PG
MCHC RBC-ENTMCNC: 30.9 GM/DL
MCV RBC AUTO: 97.2 FL
MICROALBUMIN 24H UR DL<=1MG/L-MCNC: 12.2 MG/DL
MICROALBUMIN/CREAT 24H UR-RTO: 76 MG/G
MONOCYTES # BLD AUTO: 0.58 K/UL
MONOCYTES NFR BLD AUTO: 9.1 %
NEUTROPHILS # BLD AUTO: 5.17 K/UL
NEUTROPHILS NFR BLD AUTO: 81.8 %
NITRITE URINE: NEGATIVE
PARATHYROID HORMONE INTACT: 28 PG/ML
PH URINE: 5
PHOSPHATE SERPL-MCNC: 2.4 MG/DL
PLATELET # BLD AUTO: 188 K/UL
POTASSIUM SERPL-SCNC: 4.8 MMOL/L
PROT SERPL-MCNC: 6.6 G/DL
PROTEIN URINE: 30 MG/DL
RBC # BLD: 3.23 M/UL
RBC # FLD: 17.1 %
SODIUM SERPL-SCNC: 139 MMOL/L
SPECIFIC GRAVITY URINE: 1.03
TACROLIMUS SERPL-MCNC: 4.6 NG/ML
TRIGL SERPL-MCNC: 77 MG/DL
TSH SERPL-ACNC: 1.66 UIU/ML
URATE SERPL-MCNC: 7.1 MG/DL
UROBILINOGEN URINE: 1 MG/DL
VIT B12 SERPL-MCNC: 241 PG/ML
WBC # FLD AUTO: 6.32 K/UL

## 2018-03-05 ENCOUNTER — RX RENEWAL (OUTPATIENT)
Age: 73
End: 2018-03-05

## 2018-03-11 ENCOUNTER — RX RENEWAL (OUTPATIENT)
Age: 73
End: 2018-03-11

## 2018-03-14 ENCOUNTER — APPOINTMENT (OUTPATIENT)
Dept: HEART AND VASCULAR | Facility: CLINIC | Age: 73
End: 2018-03-14
Payer: MEDICARE

## 2018-03-14 ENCOUNTER — APPOINTMENT (OUTPATIENT)
Dept: NEPHROLOGY | Facility: CLINIC | Age: 73
End: 2018-03-14
Payer: MEDICARE

## 2018-03-14 VITALS — DIASTOLIC BLOOD PRESSURE: 63 MMHG | HEART RATE: 66 BPM | SYSTOLIC BLOOD PRESSURE: 125 MMHG

## 2018-03-14 VITALS
WEIGHT: 194 LBS | SYSTOLIC BLOOD PRESSURE: 120 MMHG | HEIGHT: 69 IN | HEART RATE: 64 BPM | BODY MASS INDEX: 28.73 KG/M2 | DIASTOLIC BLOOD PRESSURE: 70 MMHG

## 2018-03-14 DIAGNOSIS — R06.02 SHORTNESS OF BREATH: ICD-10-CM

## 2018-03-14 PROCEDURE — 36415 COLL VENOUS BLD VENIPUNCTURE: CPT

## 2018-03-14 PROCEDURE — 99215 OFFICE O/P EST HI 40 MIN: CPT | Mod: 25

## 2018-03-14 PROCEDURE — 93000 ELECTROCARDIOGRAM COMPLETE: CPT

## 2018-03-14 PROCEDURE — 93306 TTE W/DOPPLER COMPLETE: CPT

## 2018-03-14 RX ORDER — FAMOTIDINE 20 MG/1
20 TABLET, FILM COATED ORAL
Qty: 90 | Refills: 3 | Status: DISCONTINUED | COMMUNITY
Start: 2017-10-25 | End: 2018-03-14

## 2018-03-14 RX ORDER — AZITHROMYCIN 250 MG/1
250 TABLET, FILM COATED ORAL
Qty: 3 | Refills: 0 | Status: DISCONTINUED | COMMUNITY
Start: 2017-12-20

## 2018-03-19 ENCOUNTER — APPOINTMENT (OUTPATIENT)
Dept: HEART AND VASCULAR | Facility: CLINIC | Age: 73
End: 2018-03-19

## 2018-03-26 ENCOUNTER — RX RENEWAL (OUTPATIENT)
Age: 73
End: 2018-03-26

## 2018-04-02 ENCOUNTER — RX RENEWAL (OUTPATIENT)
Age: 73
End: 2018-04-02

## 2018-04-05 ENCOUNTER — RX RENEWAL (OUTPATIENT)
Age: 73
End: 2018-04-05

## 2018-04-08 ENCOUNTER — RX RENEWAL (OUTPATIENT)
Age: 73
End: 2018-04-08

## 2018-04-14 ENCOUNTER — RX RENEWAL (OUTPATIENT)
Age: 73
End: 2018-04-14

## 2018-04-22 ENCOUNTER — RX RENEWAL (OUTPATIENT)
Age: 73
End: 2018-04-22

## 2018-04-30 ENCOUNTER — RX RENEWAL (OUTPATIENT)
Age: 73
End: 2018-04-30

## 2018-05-09 ENCOUNTER — RX RENEWAL (OUTPATIENT)
Age: 73
End: 2018-05-09

## 2018-05-09 RX ORDER — COLCHICINE 0.6 MG/1
0.6 TABLET, FILM COATED ORAL
Qty: 4 | Refills: 3 | Status: ACTIVE | COMMUNITY
Start: 2018-03-14 | End: 1900-01-01

## 2018-06-13 ENCOUNTER — RX RENEWAL (OUTPATIENT)
Age: 73
End: 2018-06-13

## 2018-06-18 ENCOUNTER — RX RENEWAL (OUTPATIENT)
Age: 73
End: 2018-06-18

## 2018-06-24 ENCOUNTER — RX RENEWAL (OUTPATIENT)
Age: 73
End: 2018-06-24

## 2018-07-02 ENCOUNTER — RX RENEWAL (OUTPATIENT)
Age: 73
End: 2018-07-02

## 2018-07-09 ENCOUNTER — LABORATORY RESULT (OUTPATIENT)
Age: 73
End: 2018-07-09

## 2018-07-09 ENCOUNTER — APPOINTMENT (OUTPATIENT)
Dept: NEPHROLOGY | Facility: CLINIC | Age: 73
End: 2018-07-09
Payer: MEDICARE

## 2018-07-09 VITALS — HEART RATE: 84 BPM | SYSTOLIC BLOOD PRESSURE: 157 MMHG | DIASTOLIC BLOOD PRESSURE: 76 MMHG

## 2018-07-09 VITALS — WEIGHT: 198 LBS | BODY MASS INDEX: 29.24 KG/M2

## 2018-07-09 PROCEDURE — 36415 COLL VENOUS BLD VENIPUNCTURE: CPT

## 2018-07-09 PROCEDURE — 99215 OFFICE O/P EST HI 40 MIN: CPT | Mod: 25

## 2018-07-09 PROCEDURE — 93000 ELECTROCARDIOGRAM COMPLETE: CPT

## 2018-07-09 RX ORDER — AMLODIPINE BESYLATE 2.5 MG/1
2.5 TABLET ORAL DAILY
Qty: 90 | Refills: 3 | Status: ACTIVE | COMMUNITY
Start: 2018-07-09 | End: 1900-01-01

## 2018-07-09 RX ORDER — COLCHICINE 0.6 MG/1
0.6 TABLET ORAL
Qty: 4 | Refills: 5 | Status: ACTIVE | COMMUNITY
Start: 2018-02-26 | End: 1900-01-01

## 2018-07-10 LAB
25(OH)D3 SERPL-MCNC: 42.2 NG/ML
ALBUMIN SERPL ELPH-MCNC: 4.8 G/DL
ALP BLD-CCNC: 45 U/L
ALT SERPL-CCNC: 16 U/L
ANION GAP SERPL CALC-SCNC: 16 MMOL/L
APPEARANCE: CLEAR
APTT BLD: 63.7 SEC
AST SERPL-CCNC: 18 U/L
BASOPHILS # BLD AUTO: 0.03 K/UL
BASOPHILS NFR BLD AUTO: 0.4 %
BILIRUB INDIRECT SERPL-MCNC: 0.3 MG/DL
BILIRUB SERPL-MCNC: 0.6 MG/DL
BILIRUBIN URINE: NEGATIVE
BLOOD URINE: NEGATIVE
BUN SERPL-MCNC: 18 MG/DL
CALCIUM SERPL-MCNC: 9.7 MG/DL
CALCIUM SERPL-MCNC: 9.7 MG/DL
CHLORIDE SERPL-SCNC: 101 MMOL/L
CHOLEST SERPL-MCNC: 112 MG/DL
CHOLEST/HDLC SERPL: 2.2 RATIO
CO2 SERPL-SCNC: 25 MMOL/L
COLOR: YELLOW
CREAT SERPL-MCNC: 1.18 MG/DL
CREAT SPEC-SCNC: 14 MG/DL
EOSINOPHIL # BLD AUTO: 0.13 K/UL
EOSINOPHIL NFR BLD AUTO: 1.7 %
GLUCOSE QUALITATIVE U: NEGATIVE MG/DL
GLUCOSE SERPL-MCNC: 142 MG/DL
HBA1C MFR BLD HPLC: 6.7 %
HCT VFR BLD CALC: 46.8 %
HDLC SERPL-MCNC: 50 MG/DL
HGB BLD-MCNC: 13.9 G/DL
IMM GRANULOCYTES NFR BLD AUTO: 0.4 %
INR PPP: 1.31 RATIO
KETONES URINE: NEGATIVE
LDLC SERPL CALC-MCNC: 45 MG/DL
LEUKOCYTE ESTERASE URINE: NEGATIVE
LYMPHOCYTES # BLD AUTO: 1.06 K/UL
LYMPHOCYTES NFR BLD AUTO: 13.7 %
MAGNESIUM SERPL-MCNC: 1.8 MG/DL
MAN DIFF?: NORMAL
MCHC RBC-ENTMCNC: 28 PG
MCHC RBC-ENTMCNC: 29.7 GM/DL
MCV RBC AUTO: 94.2 FL
MICROALBUMIN 24H UR DL<=1MG/L-MCNC: 1.7 MG/DL
MICROALBUMIN/CREAT 24H UR-RTO: 121 MG/G
MONOCYTES # BLD AUTO: 0.45 K/UL
MONOCYTES NFR BLD AUTO: 5.8 %
NEUTROPHILS # BLD AUTO: 6.05 K/UL
NEUTROPHILS NFR BLD AUTO: 78 %
NITRITE URINE: NEGATIVE
PARATHYROID HORMONE INTACT: 53 PG/ML
PH URINE: 5
PHOSPHATE SERPL-MCNC: 4.2 MG/DL
PLATELET # BLD AUTO: 188 K/UL
POTASSIUM SERPL-SCNC: 4.9 MMOL/L
PROT SERPL-MCNC: 7.3 G/DL
PROTEIN URINE: NEGATIVE MG/DL
PT BLD: 14.9 SEC
RBC # BLD: 4.97 M/UL
RBC # FLD: 17.2 %
SODIUM SERPL-SCNC: 142 MMOL/L
SPECIFIC GRAVITY URINE: 1.01
T4 FREE SERPL-MCNC: 1.3 NG/DL
TACROLIMUS SERPL-MCNC: 6.8 NG/ML
TRIGL SERPL-MCNC: 84 MG/DL
TSH SERPL-ACNC: 1.97 UIU/ML
URATE SERPL-MCNC: 10.1 MG/DL
UROBILINOGEN URINE: NEGATIVE MG/DL
VIT B12 SERPL-MCNC: 307 PG/ML
WBC # FLD AUTO: 7.75 K/UL

## 2018-07-16 ENCOUNTER — APPOINTMENT (OUTPATIENT)
Dept: HEART AND VASCULAR | Facility: CLINIC | Age: 73
End: 2018-07-16
Payer: MEDICARE

## 2018-07-16 VITALS
SYSTOLIC BLOOD PRESSURE: 130 MMHG | DIASTOLIC BLOOD PRESSURE: 65 MMHG | WEIGHT: 197 LBS | BODY MASS INDEX: 29.18 KG/M2 | HEIGHT: 69 IN | HEART RATE: 56 BPM

## 2018-07-16 DIAGNOSIS — F17.200 NICOTINE DEPENDENCE, UNSPECIFIED, UNCOMPLICATED: ICD-10-CM

## 2018-07-16 DIAGNOSIS — Z01.818 ENCOUNTER FOR OTHER PREPROCEDURAL EXAMINATION: ICD-10-CM

## 2018-07-16 DIAGNOSIS — I45.10 UNSPECIFIED RIGHT BUNDLE-BRANCH BLOCK: ICD-10-CM

## 2018-07-16 DIAGNOSIS — Z01.810 ENCOUNTER FOR PREPROCEDURAL CARDIOVASCULAR EXAMINATION: ICD-10-CM

## 2018-07-16 PROCEDURE — 99214 OFFICE O/P EST MOD 30 MIN: CPT | Mod: 25

## 2018-07-16 PROCEDURE — 93000 ELECTROCARDIOGRAM COMPLETE: CPT

## 2018-07-23 ENCOUNTER — RX RENEWAL (OUTPATIENT)
Age: 73
End: 2018-07-23

## 2018-07-30 ENCOUNTER — RX RENEWAL (OUTPATIENT)
Age: 73
End: 2018-07-30

## 2018-07-30 RX ORDER — IRBESARTAN 300 MG/1
300 TABLET ORAL
Qty: 90 | Refills: 3 | Status: ACTIVE | COMMUNITY
Start: 2018-07-30 | End: 1900-01-01

## 2018-08-15 ENCOUNTER — APPOINTMENT (OUTPATIENT)
Dept: NEPHROLOGY | Facility: CLINIC | Age: 73
End: 2018-08-15

## 2018-08-26 ENCOUNTER — RX RENEWAL (OUTPATIENT)
Age: 73
End: 2018-08-26

## 2018-08-27 ENCOUNTER — RX RENEWAL (OUTPATIENT)
Age: 73
End: 2018-08-27

## 2018-08-31 ENCOUNTER — RX RENEWAL (OUTPATIENT)
Age: 73
End: 2018-08-31

## 2018-09-04 ENCOUNTER — RX RENEWAL (OUTPATIENT)
Age: 73
End: 2018-09-04

## 2018-09-09 ENCOUNTER — RX RENEWAL (OUTPATIENT)
Age: 73
End: 2018-09-09

## 2018-09-12 ENCOUNTER — RX RENEWAL (OUTPATIENT)
Age: 73
End: 2018-09-12

## 2018-09-12 RX ORDER — CLOPIDOGREL BISULFATE 75 MG/1
75 TABLET, FILM COATED ORAL DAILY
Qty: 1 | Refills: 1 | Status: ACTIVE | COMMUNITY
Start: 2017-05-01 | End: 1900-01-01

## 2018-09-19 ENCOUNTER — RX RENEWAL (OUTPATIENT)
Age: 73
End: 2018-09-19

## 2018-09-19 RX ORDER — TAMSULOSIN HYDROCHLORIDE 0.4 MG/1
0.4 CAPSULE ORAL
Qty: 90 | Refills: 3 | Status: ACTIVE | COMMUNITY
Start: 2017-09-02 | End: 1900-01-01

## 2018-09-23 ENCOUNTER — RX RENEWAL (OUTPATIENT)
Age: 73
End: 2018-09-23

## 2018-09-26 PROBLEM — I50.9 HEART FAILURE, UNSPECIFIED: Chronic | Status: ACTIVE | Noted: 2017-12-18

## 2018-09-26 PROBLEM — I35.0 NONRHEUMATIC AORTIC (VALVE) STENOSIS: Chronic | Status: ACTIVE | Noted: 2017-12-18

## 2018-09-26 PROBLEM — I25.10 ATHEROSCLEROTIC HEART DISEASE OF NATIVE CORONARY ARTERY WITHOUT ANGINA PECTORIS: Chronic | Status: ACTIVE | Noted: 2017-12-18

## 2018-09-27 ENCOUNTER — MOBILE ON CALL (OUTPATIENT)
Age: 73
End: 2018-09-27

## 2018-09-27 ENCOUNTER — MEDICATION RENEWAL (OUTPATIENT)
Age: 73
End: 2018-09-27

## 2018-10-03 ENCOUNTER — RX RENEWAL (OUTPATIENT)
Age: 73
End: 2018-10-03

## 2018-10-03 RX ORDER — DILTIAZEM HYDROCHLORIDE 360 MG/1
360 CAPSULE, EXTENDED RELEASE ORAL
Qty: 1 | Refills: 1 | Status: ACTIVE | COMMUNITY
Start: 2018-10-03 | End: 1900-01-01

## 2018-10-04 ENCOUNTER — APPOINTMENT (OUTPATIENT)
Dept: NEPHROLOGY | Facility: CLINIC | Age: 73
End: 2018-10-04
Payer: MEDICARE

## 2018-10-04 VITALS — BODY MASS INDEX: 30.27 KG/M2 | WEIGHT: 205 LBS

## 2018-10-04 VITALS — DIASTOLIC BLOOD PRESSURE: 73 MMHG | SYSTOLIC BLOOD PRESSURE: 128 MMHG | HEART RATE: 73 BPM

## 2018-10-04 VITALS — SYSTOLIC BLOOD PRESSURE: 137 MMHG | DIASTOLIC BLOOD PRESSURE: 87 MMHG

## 2018-10-04 VITALS — TEMPERATURE: 36.5 F

## 2018-10-04 DIAGNOSIS — N18.6 END STAGE RENAL DISEASE: ICD-10-CM

## 2018-10-04 DIAGNOSIS — Z79.899 OTHER LONG TERM (CURRENT) DRUG THERAPY: ICD-10-CM

## 2018-10-04 DIAGNOSIS — Z23 ENCOUNTER FOR IMMUNIZATION: ICD-10-CM

## 2018-10-04 DIAGNOSIS — M10.9 GOUT, UNSPECIFIED: ICD-10-CM

## 2018-10-04 DIAGNOSIS — I10 ESSENTIAL (PRIMARY) HYPERTENSION: ICD-10-CM

## 2018-10-04 DIAGNOSIS — Z94.0 END STAGE RENAL DISEASE: ICD-10-CM

## 2018-10-04 DIAGNOSIS — I25.10 ATHEROSCLEROTIC HEART DISEASE OF NATIVE CORONARY ARTERY W/OUT ANGINA PECTORIS: ICD-10-CM

## 2018-10-04 PROCEDURE — 93000 ELECTROCARDIOGRAM COMPLETE: CPT

## 2018-10-04 PROCEDURE — 90662 IIV NO PRSV INCREASED AG IM: CPT

## 2018-10-04 PROCEDURE — 99215 OFFICE O/P EST HI 40 MIN: CPT | Mod: 25

## 2018-10-04 PROCEDURE — 36415 COLL VENOUS BLD VENIPUNCTURE: CPT

## 2018-10-04 PROCEDURE — G0008: CPT

## 2018-10-14 LAB
25(OH)D3 SERPL-MCNC: 41.3 NG/ML
ALBUMIN SERPL ELPH-MCNC: 4.4 G/DL
ALP BLD-CCNC: 54 U/L
ALT SERPL-CCNC: 10 U/L
ANION GAP SERPL CALC-SCNC: 18 MMOL/L
APPEARANCE: CLEAR
AST SERPL-CCNC: 11 U/L
BASOPHILS # BLD AUTO: 0.09 K/UL
BASOPHILS NFR BLD AUTO: 0.9 %
BILIRUB SERPL-MCNC: 0.7 MG/DL
BILIRUBIN URINE: NEGATIVE
BLOOD URINE: NEGATIVE
BUN SERPL-MCNC: 36 MG/DL
CALCIUM SERPL-MCNC: 9.4 MG/DL
CALCIUM SERPL-MCNC: 9.4 MG/DL
CHLORIDE SERPL-SCNC: 98 MMOL/L
CHOLEST SERPL-MCNC: 76 MG/DL
CHOLEST/HDLC SERPL: 2.5 RATIO
CO2 SERPL-SCNC: 24 MMOL/L
COLOR: YELLOW
CREAT SERPL-MCNC: 1.63 MG/DL
CREAT SPEC-SCNC: 55 MG/DL
EOSINOPHIL # BLD AUTO: 0.08 K/UL
EOSINOPHIL NFR BLD AUTO: 0.8 %
GLUCOSE QUALITATIVE U: NEGATIVE MG/DL
GLUCOSE SERPL-MCNC: 72 MG/DL
HBA1C MFR BLD HPLC: 6.4 %
HCT VFR BLD CALC: 44.9 %
HDLC SERPL-MCNC: 31 MG/DL
HGB BLD-MCNC: 13.2 G/DL
IMM GRANULOCYTES NFR BLD AUTO: 0.8 %
KETONES URINE: NEGATIVE
LDLC SERPL CALC-MCNC: 17 MG/DL
LEUKOCYTE ESTERASE URINE: NEGATIVE
LYMPHOCYTES # BLD AUTO: 0.58 K/UL
LYMPHOCYTES NFR BLD AUTO: 5.9 %
MAGNESIUM SERPL-MCNC: 1.8 MG/DL
MAN DIFF?: NORMAL
MCHC RBC-ENTMCNC: 28.4 PG
MCHC RBC-ENTMCNC: 29.4 GM/DL
MCV RBC AUTO: 96.8 FL
MICROALBUMIN 24H UR DL<=1MG/L-MCNC: <1.2 MG/DL
MICROALBUMIN/CREAT 24H UR-RTO: NORMAL
MONOCYTES # BLD AUTO: 1.08 K/UL
MONOCYTES NFR BLD AUTO: 10.9 %
NEUTROPHILS # BLD AUTO: 8 K/UL
NEUTROPHILS NFR BLD AUTO: 80.7 %
NITRITE URINE: NEGATIVE
PARATHYROID HORMONE INTACT: 52 PG/ML
PH URINE: 5
PHOSPHATE SERPL-MCNC: 4 MG/DL
PLATELET # BLD AUTO: 307 K/UL
POTASSIUM SERPL-SCNC: 4.8 MMOL/L
PROT SERPL-MCNC: 6.8 G/DL
PROTEIN URINE: NEGATIVE MG/DL
RBC # BLD: 4.64 M/UL
RBC # FLD: 15.8 %
SODIUM SERPL-SCNC: 140 MMOL/L
SPECIFIC GRAVITY URINE: 1.01
T4 FREE SERPL-MCNC: 1.4 NG/DL
TACROLIMUS SERPL-MCNC: 9.3 NG/ML
TRIGL SERPL-MCNC: 139 MG/DL
TSH SERPL-ACNC: 1.29 UIU/ML
URATE SERPL-MCNC: 12.8 MG/DL
UROBILINOGEN URINE: NEGATIVE MG/DL
VIT B12 SERPL-MCNC: 430 PG/ML
WBC # FLD AUTO: 9.91 K/UL

## 2018-10-14 RX ORDER — LINAGLIPTIN 5 MG/1
5 TABLET, FILM COATED ORAL
Qty: 90 | Refills: 0 | Status: ACTIVE | COMMUNITY
Start: 2018-10-14 | End: 1900-01-01

## 2018-10-16 ENCOUNTER — MEDICATION RENEWAL (OUTPATIENT)
Age: 73
End: 2018-10-16

## 2018-10-17 ENCOUNTER — APPOINTMENT (OUTPATIENT)
Age: 73
End: 2018-10-17
Payer: MEDICARE

## 2018-10-17 ENCOUNTER — APPOINTMENT (OUTPATIENT)
Dept: HEART AND VASCULAR | Facility: CLINIC | Age: 73
End: 2018-10-17
Payer: MEDICARE

## 2018-10-17 ENCOUNTER — NON-APPOINTMENT (OUTPATIENT)
Age: 73
End: 2018-10-17

## 2018-10-17 VITALS
DIASTOLIC BLOOD PRESSURE: 70 MMHG | WEIGHT: 206 LBS | BODY MASS INDEX: 30.51 KG/M2 | SYSTOLIC BLOOD PRESSURE: 124 MMHG | HEIGHT: 69 IN

## 2018-10-17 VITALS — HEART RATE: 61 BPM

## 2018-10-17 DIAGNOSIS — I48.91 UNSPECIFIED ATRIAL FIBRILLATION: ICD-10-CM

## 2018-10-17 DIAGNOSIS — R06.09 OTHER FORMS OF DYSPNEA: ICD-10-CM

## 2018-10-17 DIAGNOSIS — I34.0 NONRHEUMATIC MITRAL (VALVE) INSUFFICIENCY: ICD-10-CM

## 2018-10-17 DIAGNOSIS — I35.0 NONRHEUMATIC AORTIC (VALVE) STENOSIS: ICD-10-CM

## 2018-10-17 DIAGNOSIS — I27.20 PULMONARY HYPERTENSION, UNSPECIFIED: ICD-10-CM

## 2018-10-17 DIAGNOSIS — E78.5 HYPERLIPIDEMIA, UNSPECIFIED: ICD-10-CM

## 2018-10-17 DIAGNOSIS — I70.213 ATHEROSCLEROSIS OF NATIVE ARTERIES OF EXTREMITIES WITH INTERMITTENT CLAUDICATION, BILATERAL LEGS: ICD-10-CM

## 2018-10-17 DIAGNOSIS — E11.9 TYPE 2 DIABETES MELLITUS W/OUT COMPLICATIONS: ICD-10-CM

## 2018-10-17 DIAGNOSIS — I10 ESSENTIAL (PRIMARY) HYPERTENSION: ICD-10-CM

## 2018-10-17 PROCEDURE — 93000 ELECTROCARDIOGRAM COMPLETE: CPT

## 2018-10-17 PROCEDURE — 36415 COLL VENOUS BLD VENIPUNCTURE: CPT

## 2018-10-17 PROCEDURE — 93306 TTE W/DOPPLER COMPLETE: CPT

## 2018-10-17 PROCEDURE — 99214 OFFICE O/P EST MOD 30 MIN: CPT | Mod: 25

## 2018-10-18 ENCOUNTER — CHART COPY (OUTPATIENT)
Age: 73
End: 2018-10-18

## 2018-10-18 LAB
ANION GAP SERPL CALC-SCNC: 17 MMOL/L
BUN SERPL-MCNC: 38 MG/DL
CALCIUM SERPL-MCNC: 9.4 MG/DL
CHLORIDE SERPL-SCNC: 96 MMOL/L
CO2 SERPL-SCNC: 25 MMOL/L
CREAT SERPL-MCNC: 1.53 MG/DL
GLUCOSE SERPL-MCNC: 144 MG/DL
NT-PROBNP SERPL-MCNC: 6022 PG/ML
POTASSIUM SERPL-SCNC: 4.7 MMOL/L
SODIUM SERPL-SCNC: 138 MMOL/L

## 2018-10-21 ENCOUNTER — RX RENEWAL (OUTPATIENT)
Age: 73
End: 2018-10-21

## 2018-10-22 ENCOUNTER — APPOINTMENT (OUTPATIENT)
Dept: CARDIOTHORACIC SURGERY | Facility: CLINIC | Age: 73
End: 2018-10-22

## 2019-05-07 NOTE — PROGRESS NOTE ADULT - ATTENDING COMMENTS
Patient examined, resident's hx and PE reviewed and confirmed. I find transplant pyelonephritis improved, LORENA improved. A/P reviewed and confirmed. Follow SCr. Continue anti-hypertensives. Recheck cultures. See full note.
Patient examined, resident's hx and PE reviewed and confirmed. I find LORENA, transplant pyelo. A/P reviewed and confirmed. Follow SCr. Continue abx. Keep MAP > 65. See full note.
No

## 2021-03-31 NOTE — CONSULT NOTE ADULT - CONSULT REQUESTED DATE/TIME
24-Jan-2017 18:56 DISPLAY PLAN FREE TEXT DISPLAY PLAN FREE TEXT DISPLAY PLAN FREE TEXT DISPLAY PLAN FREE TEXT DISPLAY PLAN FREE TEXT DISPLAY PLAN FREE TEXT DISPLAY PLAN FREE TEXT DISPLAY PLAN FREE TEXT

## 2021-09-08 NOTE — ED ADULT TRIAGE NOTE - [facility]
Olivia Whiteside called requesting a refill on the following medications:  Requested Prescriptions     Pending Prescriptions Disp Refills    amLODIPine (NORVASC) 2.5 MG tablet 30 tablet 3     Sig: Take 1 tablet by mouth daily     Pharmacy verified:[facility]   . pv      Date of last visit: 09/01/21  Date of next visit (if applicable): 3/0/1656
[address]

## 2022-05-23 NOTE — H&P ADULT - PROBLEM SELECTOR PLAN 1
Patient met SIRS criteria on admission with elevated Temp, Leukocytosis, Tachycardia. Severe sepsis because of elevation of Cr showing end organ dysfunction.   -S/P 2L NS Bolus properly fluid resucitated  -Start patient on Zosyn 3.375 q6h for treatment of presumed complicated UTI  -Patient at increased risk due to immunosuppression secondary to kidney transplant based medications. Purse String (Intermediate) Text: Given the location of the defect and the characteristics of the surrounding skin a purse string intermediate closure was deemed most appropriate.  Undermining was performed circumfirentially around the surgical defect.  A purse string suture was then placed and tightened.

## 2022-12-27 NOTE — PATIENT PROFILE ADULT. - PRO MENTAL HEALTH SX RECENT
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  RT completed on 5/31/22  and  on chemo 7/5/22 5 pills per month for 12 months and 3 more months left. Pt is here for trach change and continue coughing, hard to sleep. pt over all improving, trach is wearing correctly. \par \par  none

## 2024-03-22 NOTE — ED ADULT NURSE NOTE - NS ED NOTE  TALK SOMEONE YN
.  Risk  Yes No   Present to ED because of fall  X   Age > 70  X   Altered Mental Status    Intoxicated with Alcohol or    Substance Confusion  X   Impaired Mobility       Ambulance or transfers with  assistive devices or assist    Ambulates with unsteady gait and requires assistance     Unable to ambulate or transfer  X   Nursing Judgement (free text)        [ X]pt educated on call light usage, to call RN when needed  [ X] Bed in Lowest Position  [ X] Side Rail x2  [ X] Call Light Within Reach  [ X] Patient belongings within reach    
Per pt last drink was a couple of days ago, abd pain at 2 out of 10  
No

## 2025-07-07 NOTE — PROGRESS NOTE ADULT - PROBLEM SELECTOR PLAN 5
Hematology/Medical Oncology Ambulatory Note      Patient Information     Patient Name:         Vic Mckeon  MRN:                       [id number]  YOB: 1947   77 year old  Encounter Date:      7/7/2025     Patient Care Team:  Lucía Rivers DO as PCP - General (Family Practice)  Lissa Coffman MD as Specialist (Hematology & Oncology)    Chief Complaint   Patient presents with   • Office Visit         Cancer Staging:    Cancer Staging  No matching staging information was found for the patient.    Treatment History:   Surveillance    PROFILE:  Vic Mckeon is a 77 year old male seen for intermittent, mild leukopenia.  Copper, B12, and folate are normal.  HIV, hepatitis, and CMV serologies were negative.  EBV serologies showed past exposure but no recent reactivation.    Interval History:  He feels well overall.  He has been eating more red meat.  He had a lump at his left neck which was sore, but it has resolved - no further pain or discomfort, and now smaller.  He does have a residual right drop foot.  No new lumps or bumps.  No other pain or discomfort.  He has not been sick lately.  No fevers, chills, or sweats.  No chest pain, SOB, or palpitations.  No falls, trauma, syncope, headaches, dizziness, or confusion.  Normal bowel and bladder function.  No bleeding anywhere.    Oncology History:  Oncology History    No history exists.       Past Medical, Surgical, Family and Social History  Past Medical History:   Diagnosis Date   • Anxiety    • Arthritis    • Gastritis    • Hiatal hernia    • Hyperlipoproteinemia    • Hypertension    • Panic attack    • Spinal stenosis        Past Surgical History:   Procedure Laterality Date   • Cardiac catherization     • Ir spine injection  2018    lumbar     Family History   Problem Relation Age of Onset   • Coronary Artery Disease Father          MI at age 72   • Other Mother          Coronary thrombosis  3   CVA at age 4   • Cancer 
Brother    • Aneurysm Neg Hx         Negative for AAA     Social History     Tobacco Use   • Smoking status: Former     Passive exposure: Past   • Smokeless tobacco: Never   • Tobacco comments:     Former tobacco use. used to smoke but quit and 1972   Vaping Use   • Vaping status: never used   Substance Use Topics   • Alcohol use: No     Comment: drinks rarely   • Drug use: Never     Allergies and Adverse Drug Reactions  ALLERGIES:  Atorvastatin calcium, Gluten meal   (food or med), Monascus purpureus went yeast, Penicillins, and Statins    Medications  Current Outpatient Medications   Medication Sig Dispense Refill   • Calcium Carb-Cholecalciferol (Calcium 500 + D) 500-5 MG-MCG Tab      • vitamin B-12 (CYANOCOBALAMIN) 1000 MCG tablet Take 1,000 mcg by mouth daily.     • amLODIPine (NORVASC) 5 MG tablet TAKE 1 TABLET BY MOUTH EVERY DAY 90 tablet 3   • losartan-hydrochlorothiazide (HYZAAR) 100-12.5 MG per tablet Take 1 tablet by mouth daily.     • Ascorbic Acid (VITAMIN C PO) Take 1 tablet by mouth daily.     • tadalafil (CIALIS) 5 MG tablet Take 5 mg by mouth.     • Multiple Vitamin tablet Take 1 tablet by mouth.     • magnesium oxide (MAG-OX) 400 MG tablet Take 400 mg by mouth. Pt states he is only taking 200mg now     • Saccharomyces boulardii (PROBIOTIC) 250 MG Cap Take 250 mg by mouth.     • coenzyme Q10 100 MG capsule 1 capsule daily     • Cholecalciferol (VITAMIN D3) 5000 units Tab 1 tablet daily       No current facility-administered medications for this visit.     Preventative Health:  Last colonoscopy: EGD on 9/9/21 with gastritis; colonoscopy 2020, normal  Last PSA: annual, no cancer (7/27/22)    Subjective     Review of Systems:  Review of Systems   Constitutional:  Negative for activity change, appetite change, chills, diaphoresis, fatigue, fever and unexpected weight change.   HENT:  Negative for hearing loss, trouble swallowing and voice change.    Eyes:  Negative for visual disturbance.   Respiratory: 
 Negative for apnea, cough, choking, chest tightness, shortness of breath and wheezing.    Cardiovascular:  Negative for chest pain, palpitations and leg swelling.   Gastrointestinal:  Negative for abdominal distention, abdominal pain, blood in stool, constipation, diarrhea, nausea and vomiting.   Genitourinary:  Negative for difficulty urinating, dysuria, frequency and hematuria.   Musculoskeletal:  Negative for arthralgias, back pain, joint swelling and myalgias.   Skin:  Negative for rash.   Neurological:  Negative for dizziness, syncope, speech difficulty, weakness, light-headedness and headaches.   Hematological:  Negative for adenopathy. Does not bruise/bleed easily.   Psychiatric/Behavioral:  Negative for confusion and sleep disturbance.        Objective   Blood pressure (!) 150/80, pulse (!) 60, temperature 98.1 °F (36.7 °C), temperature source Tympanic, resp. rate 16, height 6' 1\" (1.854 m), weight 84.8 kg (187 lb), SpO2 100%.  Physical Exam  Vitals reviewed.   Constitutional:       General: He is not in acute distress.  HENT:      Head: Normocephalic and atraumatic.      Mouth/Throat:      Mouth: Mucous membranes are moist.      Pharynx: Oropharynx is clear.      Neck: Normal range of motion and neck supple.   Eyes:      General: No scleral icterus.     Extraocular Movements: Extraocular movements intact.      Conjunctiva/sclera: Conjunctivae normal.      Pupils: Pupils are equal, round, and reactive to light.   Cardiovascular:      Rate and Rhythm: Normal rate and regular rhythm.      Pulses: Normal pulses.      Heart sounds: Normal heart sounds. No murmur heard.  Pulmonary:      Breath sounds: Normal breath sounds. No wheezing or rales.   Abdominal:      General: Bowel sounds are normal. There is no distension.      Palpations: Abdomen is soft.      Tenderness: There is no abdominal tenderness.   Musculoskeletal:         General: No swelling or tenderness. Normal range of motion.   Lymphadenopathy:      
Head:      Right side of head: No submental, submandibular, tonsillar, preauricular, posterior auricular or occipital adenopathy.      Left side of head: No submental, submandibular, tonsillar, preauricular, posterior auricular or occipital adenopathy.      Cervical: No cervical adenopathy.      Upper Body:      Right upper body: No supraclavicular or axillary adenopathy.      Left upper body: No supraclavicular or axillary adenopathy.      Lower Body: No right inguinal adenopathy. No left inguinal adenopathy.   Skin:     General: Skin is warm and dry.      Coloration: Skin is not jaundiced.      Findings: No bruising or rash.   Neurological:      General: No focal deficit present.      Mental Status: He is alert and oriented to person, place, and time. Mental status is at baseline.   Psychiatric:         Mood and Affect: Mood normal.         Behavior: Behavior normal.         Judgment: Judgment normal.       Performance Status  0: Fully active, able to carry on all pre-disease performance without restriction    Pain score: 0    Pain location: N/A not currently    Review of Laboratory Data    Lab Results   Component Value Date    WBC 4.6 07/07/2025    WBC 4.3 08/17/2024    HCT 37.6 (L) 07/07/2025    HCT 38.9 (L) 08/17/2024    HGB 12.4 (L) 07/07/2025    HGB 12.7 (L) 08/17/2024     07/07/2025     08/17/2024     06/28/2024     01/16/2024     Lab Results   Component Value Date    SODIUM 140 08/17/2024    SODIUM 140 06/28/2024    POTASSIUM 3.7 08/17/2024    POTASSIUM 4.0 06/28/2024    CHLORIDE 106 08/17/2024    CHLORIDE 107 06/28/2024    CO2 29 08/17/2024    CO2 29 06/28/2024    BUN 11 08/17/2024    BUN 12 06/28/2024    BUN 13 01/16/2024    BUN 15 01/13/2024    CREATININE 0.92 08/17/2024    CREATININE 0.93 06/28/2024    CREATININE 0.94 01/16/2024    CREATININE 0.81 01/13/2024    GLUCOSE 112 (H) 08/17/2024    GLUCOSE 117 (H) 06/28/2024    GLUCOSE 91 01/16/2024    GLUCOSE 96 01/13/2024     Lab 
Results   Component Value Date    AST 25 08/17/2024    AST 27 06/28/2024    GPT 29 08/17/2024    GPT 36 06/28/2024    ALKPT 70 08/17/2024    ALKPT 80 06/28/2024    BILIRUBIN 0.7 08/17/2024    BILIRUBIN 0.7 06/28/2024       Review of Ancillary Studies  None    ASSESSMENT and PLAN:  Chronic leukopenia/neutropenia, improved.  This may be congenital and benign.  Follow CBC and CMP.  Intermittent mild normocytic anemia, likely due to normal post-operative anemia.  Check B12, folate, and iron studies with ferritin.  Continue B12 1000mcg daily.    The patient will call 1-2 days after completing the above studies to discuss the results with a RN or the physician.    The patient will return to clinic in a year or earlier as needed and call at any time with any additional questions.  The risks and benefits of the above plan were fully explained.  The patient voiced understanding of these issues and the plan, and is in agreement with the plan.  All of the patient's concerns were addressed and questions were answered.    Approximately 40 minutes were spent regarding the patient, of which greater than 50% was in counseling.    Lissa Coffman MD  7/7/2025  2:29 PM    
C/w diltiazem, valsartan
C/w diltiazem, valsartan